# Patient Record
Sex: FEMALE | Race: OTHER | Employment: UNEMPLOYED | URBAN - METROPOLITAN AREA
[De-identification: names, ages, dates, MRNs, and addresses within clinical notes are randomized per-mention and may not be internally consistent; named-entity substitution may affect disease eponyms.]

---

## 2018-05-14 ENCOUNTER — OFFICE VISIT (OUTPATIENT)
Dept: HEMATOLOGY | Age: 53
End: 2018-05-14

## 2018-05-14 VITALS
DIASTOLIC BLOOD PRESSURE: 82 MMHG | BODY MASS INDEX: 28.89 KG/M2 | TEMPERATURE: 97 F | OXYGEN SATURATION: 94 % | RESPIRATION RATE: 18 BRPM | WEIGHT: 157 LBS | HEART RATE: 68 BPM | SYSTOLIC BLOOD PRESSURE: 122 MMHG | HEIGHT: 62 IN

## 2018-05-14 DIAGNOSIS — B18.2 CHRONIC HEPATITIS C WITHOUT HEPATIC COMA (HCC): Primary | ICD-10-CM

## 2018-05-14 DIAGNOSIS — B18.2 CHRONIC HEPATITIS C WITHOUT HEPATIC COMA (HCC): ICD-10-CM

## 2018-05-14 PROBLEM — K21.9 GERD (GASTROESOPHAGEAL REFLUX DISEASE): Status: ACTIVE | Noted: 2018-05-14

## 2018-05-14 PROBLEM — M85.80 OSTEOPENIA: Status: ACTIVE | Noted: 2018-05-14

## 2018-05-14 PROBLEM — F10.10 ALCOHOL ABUSE: Status: ACTIVE | Noted: 2018-05-14

## 2018-05-14 PROBLEM — Z72.0 TOBACCO USE: Status: ACTIVE | Noted: 2018-05-14

## 2018-05-14 PROBLEM — M06.9 CHRONIC RHEUMATIC ARTHRITIS (HCC): Status: ACTIVE | Noted: 2018-05-14

## 2018-05-14 PROBLEM — Z90.49 S/P CHOLECYSTECTOMY: Status: ACTIVE | Noted: 2018-05-14

## 2018-05-14 PROBLEM — Z98.890 H/O FOOT SURGERY: Status: ACTIVE | Noted: 2018-05-14

## 2018-05-14 RX ORDER — MONTELUKAST SODIUM 10 MG/1
TABLET ORAL
Refills: 1 | COMMUNITY
Start: 2018-04-16 | End: 2022-04-18 | Stop reason: SDUPTHER

## 2018-05-14 RX ORDER — LACTULOSE 10 G/15ML
SOLUTION ORAL; RECTAL
Refills: 0 | COMMUNITY
Start: 2018-02-21 | End: 2019-02-14

## 2018-05-14 RX ORDER — MOMETASONE FUROATE 50 UG/1
SPRAY, METERED NASAL
Refills: 6 | COMMUNITY
Start: 2018-03-26 | End: 2019-04-04

## 2018-05-14 RX ORDER — OMEPRAZOLE 20 MG/1
CAPSULE, DELAYED RELEASE ORAL
Refills: 6 | COMMUNITY
Start: 2018-03-21 | End: 2022-04-18 | Stop reason: SDUPTHER

## 2018-05-14 NOTE — MR AVS SNAPSHOT
[address] [address] 
[phone] Patient: Nancy Mays MRN: [id number] :1965 Visit Information Date & Time Provider Department Dept. Phone Encounter #  
 2018 11:30 AM MD Gómez Corbin [address] of  [address] [id number] Follow-up Instructions Return in about 4 weeks (around 2018) for NP. Upcoming Health Maintenance Date Due Hepatitis C Screening 1965 DTaP/Tdap/Td series (1 - Tdap) 1984 PAP AKA CERVICAL CYTOLOGY 1984 BREAST CANCER SCRN MAMMOGRAM 2013 FOBT Q 1 YEAR AGE 50-75 2013 Influenza Age 5 to Adult 2018 Allergies as of 2018  Review Complete On: 2018 By: Chinyere Melgoza Severity Noted Reaction Type Reactions Phenobarbital High 2014    Swelling Organs swell Dilantin [Phenytoin Sodium Extended]  2018    Swelling Current Immunizations  Never Reviewed No immunizations on file. Not reviewed this visit You Were Diagnosed With   
  
 Codes Comments Chronic hepatitis C without hepatic coma (HCC)    -  Primary ICD-10-CM: B18.2 ICD-9-CM: 070.54 Vitals BP Pulse Temp Resp Height(growth percentile) Weight(growth percentile) 122/82 (BP 1 Location: Left arm, BP Patient Position: Sitting) 68 97 °F (36.1 °C) (Tympanic) 18 5' 2\" (1.575 m) 157 lb (71.2 kg) SpO2 BMI OB Status Smoking Status 94% 28.72 kg/m2 Postmenopausal Current Every Day Smoker Vitals History BMI and BSA Data Body Mass Index Body Surface Area 28.72 kg/m 2 1.76 m 2 Your Updated Medication List  
  
   
This list is accurate as of 18 12:29 PM.  Always use your most recent med list.  
  
  
  
  
 lactulose 10 gram/15 mL solution Commonly known as:  CHRONULAC  
  
 mometasone 50 mcg/actuation nasal spray Commonly known as:  NASONEX  
  
 montelukast 10 mg tablet Commonly known as:  SINGULAIR  
  
 omeprazole 20 mg capsule Commonly known as:  PRILOSEC We Performed the Following FERRITIN [33121 CPT(R)] IRON PROFILE Z3062031 CPT(R)] Follow-up Instructions Return in about 4 weeks (around 6/11/2018) for NP. To-Do List   
 05/14/2018 Lab:  CBC WITH AUTOMATED DIFF   
  
 05/14/2018 Lab:  HCV RNA BY GENIE QL,RFLX TO QT   
  
 05/14/2018 Lab:  HEP A AB, TOTAL   
  
 05/14/2018 Lab:  HEP B SURFACE AB   
  
 05/14/2018 Lab:  HEP B SURFACE AG   
  
 05/14/2018 Lab:  HEP C GENOTYPE   
  
 05/14/2018 Lab:  HEPATIC FUNCTION PANEL   
  
 05/14/2018 Lab:  HEPATITIS B CORE AB, TOTAL   
  
 05/14/2018 Lab:  METABOLIC PANEL, BASIC   
  
 05/14/2018 Imaging:  US ABD LTD W ELASTOGRAPHY Introducing [facility] & HEALTH SERVICES! Micah Crook introduces fflap patient portal. Now you can access parts of your medical record, email your doctor's office, and request medication refills online. 1. In your internet browser, go to [email or web address] 2. Click on the First Time User? Click Here link in the Sign In box. You will see the New Member Sign Up page. 3. Enter your fflap Access Code exactly as it appears below. You will not need to use this code after youve completed the sign-up process. If you do not sign up before the expiration date, you must request a new code. · fflap Access Code: [email or web address] Expires: 8/12/2018 12:29 PM 
 
4. Enter the last four digits of your Social Security Number (xxxx) and Date of Birth (mm/dd/yyyy) as indicated and click Submit. You will be taken to the next sign-up page. 5. Create a Daojiat ID. This will be your Daojiat login ID and cannot be changed, so think of one that is secure and easy to remember. 6. Create a Daojiat password. You can change your password at any time. 7. Enter your Password Reset Question and Answer. This can be used at a later time if you forget your password. 8. Enter your e-mail address. You will receive e-mail notification when new information is available in [address] 9. Click Sign Up. You can now view and download portions of your medical record. 10. Click the Download Summary menu link to download a portable copy of your medical information. If you have questions, please visit the Frequently Asked Questions section of the [employer] website. Remember, [employer] is NOT to be used for urgent needs. For medical emergencies, dial 911. Now available from your iPhone and Android! Please provide this summary of care documentation to your next provider. Your primary care clinician is listed as Trino Solorio. If you have any questions after today's visit, please call [phone].

## 2018-05-14 NOTE — PROGRESS NOTES
[address] Coles MD, FACP, [address] [place], [place]       John Mathias, JASON Bernstein ACNP-BC   OBINNA Marquez NP [address] [facility] 136    at [address], [address] Michael Noel  22.    [phone]    FAX: [address] [place] [address], [address]    [phone]    FAX: [phone]         Patient Care Team:  Lenora Mancini as PCP - General (Physician Assistant)  Dagoberto Bell MD (Rheumatology)      Problem List  Date Reviewed: 5/14/2018          Codes Class Noted    Chronic hepatitis C ([facility] ICD-10-CM: B18.2  ICD-9-CM: 070.54  5/14/2018        Chronic rheumatic arthritis ([facility] ICD-10-CM: M06.9  ICD-9-CM: 714.0  5/14/2018        Osteopenia ICD-10-CM: M85.80  ICD-9-CM: 733.90  5/14/2018        GERD (gastroesophageal reflux disease) ICD-10-CM: K21.9  ICD-9-CM: 530.81  5/14/2018        Alcohol abuse ICD-10-CM: F10.10  ICD-9-CM: 305.00  5/14/2018        Tobacco use ICD-10-CM: Z72.0  ICD-9-CM: 305.1  5/14/2018        S/P cholecystectomy ICD-10-CM: Z90.49  ICD-9-CM: V45.79  5/14/2018                The physicians listed above have asked me to see Emily Hebert in consultation regarding chronic HCV and its management. All medical records sent by the referring physicians were reviewed including imaging studies     The patient is a 45 y.o. Black female who was noted to have abnormalities in liver chemistries and subsequently tested positive for chronic HCV in 1990s. Risk factors for acquiring HCV are IV drug use. There was no history of acute incteric hepatitis at the time of these risk factors. Imaging of the liver was not recently performed.       An assessment of liver fibrosis with biopsy or elastography has not been performed. The patient has never received treatment for chronic HCV. The most recent laboratory studies indicate that the liver transaminases are normal, alkaline phosphatase is normal, tests of hepatic synthetic and metabolic function are normal,   and the platelet count is normal.      The patient notes fatigue,     The patient has not experienced pain in the right side over the liver,     The patient has limitations in functional activities which can be attributed to other medical problems that are not related to the liver disease. ALLERGIES  Allergies   Allergen Reactions    Phenobarbital Swelling     Organs swell    Dilantin [Phenytoin Sodium Extended] Swelling       MEDICATIONS  Current Outpatient Prescriptions   Medication Sig    lactulose (CHRONULAC) 10 gram/15 mL solution     mometasone (NASONEX) 50 mcg/actuation nasal spray     omeprazole (PRILOSEC) 20 mg capsule     montelukast (SINGULAIR) 10 mg tablet      No current facility-administered medications for this visit. SYSTEM REVIEW NOT RELATED TO LIVER DISEASE OR REVIEWED ABOVE:  Constitution systems: Negative for fever, chills, weight gain, weight loss. Eyes: Negative for visual changes. ENT: Negative for sore throat, painful swallowing. Respiratory: Negative for cough, hemoptysis, SOB. Cardiology: Negative for chest pain, palpitations. GI:  Negative for constipation or diarrhea. : Negative for urinary frequency, dysuria, hematuria, nocturia. Skin: Negative for rash. Hematology: Negative for easy bruising, blood clots. Musculo-skelatal: Recent wrist surgery for RA. In a cast.    Neurologic: Chronic back pain. Psychology: Negative for anxiety, depression. FAMILY HISTORY:  The father  of CAD. The mother has the following chronic diseases: cancer. There is no family history of liver disease.       SOCIAL HISTORY:  The patient has never been . The patient has 4 children, 1 of whom is ,   The patient currently smokes 7 cigarettes daily. The patient consumes 2-5 alcoholic beverages per day. The patient is currently receiving disability. PHYSICAL EXAMINATION:  Visit Vitals    /82 (BP 1 Location: Left arm, BP Patient Position: Sitting)    Pulse 68    Temp 97 °F (36.1 °C) (Tympanic)    Resp 18    Ht 5' 2\" (1.575 m)    Wt 157 lb (71.2 kg)    SpO2 94%    BMI 28.72 kg/m2     General: No acute distress. Eyes: Sclera anicteric. ENT: No oral lesions. Thyroid normal.  Nodes: No adenopathy. Skin: No spider angiomata. No jaundice. No palmar erythema. Respiratory: Lungs clear to auscultation. Cardiovascular: Regular heart rate. No murmurs. No JVD. Abdomen: Soft non-tender. Liver size normal to percussion/palpation. Spleen not palpable. No obvious ascites. Extremities: No edema. No muscle wasting. No gross arthritic changes. Neurologic: Alert and oriented. Cranial nerves grossly intact. No asterixis. LABORATORY STUDIES:  From 10/2017  AST/ALT/ALP/T Bili/ALB:  31/32/60/0.6/4.5  WBC/HB/PLT/INR:  7.4/15.5/225  BUN/CREAT:  8/0.7    SEROLOGIES:  HCV RNA Log 5.75 intU    LIVER HISTOLOGY:  Not available or performed    ENDOSCOPIC PROCEDURES:  2018. Colonoscopy by Dr Mary Anne Simeon. Adenoma polys    RADIOLOGY:  Not available or performed    OTHER TESTING:  Not available or performed    ASSESSMENT AND PLAN:  Chronic HCV of unclear severity. Liver function is normal.  The platelet count is normal.      Will perform laboratory testing to monitor liver function and degree of liver injury. This included BMP, hepatic panel, CBC with platelet count,     Will perform and/or review results of HCV viral load and HCV genotype to define the specific treatment and duration of treatment that will be required. Will perform serologic and virologic studies to assess for other causes of chronic liver disease.       Will perform imaging of the liver with ultrasound. The need to assess liver fibrosis was discussed. Sheer wave elastography can assess liver fibrosis and determine if a patient has advanced fibrosis or cirrhosis without the need for liver biopsy. Sheer wave elastography is now available at [facility] [employer]. This will be scheduled. The patient has not previously been treated for HCV. Discussed the treatment alternatives. The SVR/cure rate for HCV now exceeds 90% with just oral anti-viral therapy and no interferon injections or significant side effects for most patients with HCV. The specific treatment is dependent upon genotype, viral load and histology. Also discussed enrolling in the Target HCV database which is nationwide program into which the results of clinical treatment of HCV is reported. The patient was directed to continue all current medications at the current dosages. There are no contraindications for the patient to take any medications that are necessary for treatment of other medical issues. The patient was counseled regarding alcohol consumption. The need for vaccination against viral hepatitis A and B will be assessed with serologic and instituted as appropriate. All of the above issues were discussed with the patient. All questions were answered. The patient expressed a clear understanding of the above. [facility] 87 in 4 weeks to initiate HCV treatment.     Dwight Sanchez MD  Liver [place] of [address], [address] [facility], [address] [place] [address] Street - [address]  [phone]

## 2018-05-14 NOTE — PROGRESS NOTES
1. Have you been to the ER, urgent care clinic since your last visit? Hospitalized since your last visit? No    2. Have you seen or consulted any other health care providers outside of the [address] [place] [address] since your last visit? Include any pap smears or colon screening.  PCP-Jimmy LEMUS. 12.     Chief Complaint   Patient presents with    New Patient

## 2018-05-24 ENCOUNTER — HOSPITAL ENCOUNTER (OUTPATIENT)
Dept: ULTRASOUND IMAGING | Age: 53
Discharge: HOME | End: 2018-05-24
Attending: INTERNAL MEDICINE
Payer: MEDICAID

## 2018-05-24 ENCOUNTER — HOSPITAL ENCOUNTER (OUTPATIENT)
Dept: LAB | Age: 53
Discharge: HOME | End: 2018-05-24

## 2018-05-24 DIAGNOSIS — B18.2 CHRONIC HEPATITIS C WITHOUT HEPATIC COMA (HCC): ICD-10-CM

## 2018-05-24 LAB — SENTARA SPECIMEN COL,SENBCF: NORMAL

## 2018-05-24 PROCEDURE — 0346T US ABD LTD W ELASTOGRAPHY: CPT

## 2018-05-24 PROCEDURE — 99001 SPECIMEN HANDLING PT-LAB: CPT | Performed by: INTERNAL MEDICINE

## 2018-05-25 LAB
A-G RATIO,AGRAT: 1.3 RATIO (ref 1.1–2.6)
ABSOLUTE LYMPHOCYTE COUNT, 10803: 1.8 K/UL (ref 1–4.8)
ALBUMIN SERPL-MCNC: 4.3 G/DL (ref 3.5–5)
ALP SERPL-CCNC: 58 U/L (ref 25–115)
ALT SERPL-CCNC: 22 U/L (ref 5–40)
ANION GAP SERPL CALC-SCNC: 17 MMOL/L
AST SERPL W P-5'-P-CCNC: 25 U/L (ref 10–37)
BASOPHILS # BLD: 0 K/UL (ref 0–0.2)
BASOPHILS NFR BLD: 0 % (ref 0–2)
BILIRUB SERPL-MCNC: 0.5 MG/DL (ref 0.2–1.2)
BILIRUBIN, DIRECT,CBIL: <0.2 MG/DL (ref 0–0.3)
BUN SERPL-MCNC: 11 MG/DL (ref 6–22)
CALCIUM SERPL-MCNC: 9.5 MG/DL (ref 8.4–10.5)
CHLORIDE SERPL-SCNC: 96 MMOL/L (ref 98–110)
CO2 SERPL-SCNC: 27 MMOL/L (ref 20–32)
CREAT SERPL-MCNC: 0.7 MG/DL (ref 0.5–1.2)
EOSINOPHIL # BLD: 0.1 K/UL (ref 0–0.5)
EOSINOPHIL NFR BLD: 2 % (ref 0–6)
ERYTHROCYTE [DISTWIDTH] IN BLOOD BY AUTOMATED COUNT: 14.3 % (ref 10–15.5)
FE % SATURATION,PSAT: 44 % (ref 20–50)
FERRITIN SERPL-MCNC: 321 NG/ML (ref 10–291)
GFRAA, 66117: >60
GFRNA, 66118: >60
GLOBULIN,GLOB: 3.3 G/DL (ref 2–4)
GLUCOSE SERPL-MCNC: 111 MG/DL (ref 70–99)
GRANULOCYTES,GRANS: 53 % (ref 40–75)
HBV SURFACE AB SER RIA-ACNC: DETECTED
HCT VFR BLD AUTO: 45 % (ref 35.1–48)
HCV PCR LOG10, 20021: 5.85 {LOG_IU}/ML
HEP B CORE AB, TOTAL, 006718: DETECTED
HEP B SURFACE AG SCRN, 006510: NORMAL
HEPATITIS C RNA-PCR, 550401: ABNORMAL IU/ML
HGB BLD-MCNC: 14.1 G/DL (ref 11.7–16)
IRON,IRN: 123 MCG/DL (ref 30–160)
LYMPHOCYTES, LYMLT: 34 % (ref 20–45)
MCH RBC QN AUTO: 29 PG (ref 26–34)
MCHC RBC AUTO-ENTMCNC: 31 G/DL (ref 31–36)
MCV RBC AUTO: 92 FL (ref 80–95)
MONOCYTES # BLD: 0.6 K/UL (ref 0.1–1)
MONOCYTES NFR BLD: 12 % (ref 3–12)
NEUTROPHILS # BLD AUTO: 2.8 K/UL (ref 1.8–7.7)
PLATELET # BLD AUTO: 223 K/UL (ref 140–440)
PMV BLD AUTO: 10.9 FL (ref 9–13)
POTASSIUM SERPL-SCNC: 4.5 MMOL/L (ref 3.5–5.5)
PROT SERPL-MCNC: 7.6 G/DL (ref 6.4–8.3)
RBC # BLD AUTO: 4.87 M/UL (ref 3.8–5.2)
SODIUM SERPL-SCNC: 140 MMOL/L (ref 133–145)
TIBC,TIBC: 277 MCG/DL (ref 228–428)
UIBC SERPL-MCNC: 154 MCG/DL (ref 110–370)
WBC # BLD AUTO: 5.2 K/UL (ref 4–11)

## 2018-05-26 LAB — HEP A AB, TOTAL, 006726: NEGATIVE

## 2018-05-31 LAB
HCV PCR LOG10, 20021: 5.85 {LOG_IU}/ML
HCV RNA QUAL PCR,9951751: NORMAL
HEPATITIS C RNA-PCR, 550401: ABNORMAL IU/ML

## 2018-06-01 LAB
HCV PCR LOG10, 20021: 5.85 {LOG_IU}/ML
HCV RNA QUAL PCR,9951751: NORMAL
HEPATITIS C GENOTYPE, 551278: NORMAL
HEPATITIS C RNA-PCR, 550401: ABNORMAL IU/ML

## 2018-06-11 ENCOUNTER — OFFICE VISIT (OUTPATIENT)
Dept: HEMATOLOGY | Age: 53
End: 2018-06-11

## 2018-06-11 VITALS
RESPIRATION RATE: 16 BRPM | TEMPERATURE: 97.1 F | DIASTOLIC BLOOD PRESSURE: 80 MMHG | SYSTOLIC BLOOD PRESSURE: 111 MMHG | HEART RATE: 68 BPM | OXYGEN SATURATION: 99 % | HEIGHT: 62 IN | BODY MASS INDEX: 27.97 KG/M2 | WEIGHT: 152 LBS

## 2018-06-11 DIAGNOSIS — B18.2 CHRONIC HEPATITIS C WITHOUT HEPATIC COMA (HCC): Primary | ICD-10-CM

## 2018-06-11 NOTE — MR AVS SNAPSHOT
[address] [address] 
[phone] Patient: Jenn Nguyen MRN: [id number] :1965 Visit Information Date & Time Provider Department Dept. Phone Encounter #  
 2018 10:00 AM OBINNA Finley [address] of  [address] [id number] Follow-up Instructions Return in about 6 weeks (around 2018). Upcoming Health Maintenance Date Due Pneumococcal 19-64 Medium Risk (1 of 1 - PPSV23) 1982 DTaP/Tdap/Td series (1 - Tdap) 1984 PAP AKA CERVICAL CYTOLOGY 1984 BREAST CANCER SCRN MAMMOGRAM 2013 FOBT Q 1 YEAR AGE 50-75 2013 Influenza Age 5 to Adult 2018 Allergies as of 2018  Review Complete On: 2018 By: Martha Dial Severity Noted Reaction Type Reactions Phenobarbital High 2014    Swelling Organs swell Dilantin [Phenytoin Sodium Extended]  2018    Swelling Current Immunizations  Never Reviewed No immunizations on file. Not reviewed this visit Vitals BP Pulse Temp Resp Height(growth percentile) 111/80 (BP 1 Location: Right arm, BP Patient Position: Sitting) 68 97.1 °F (36.2 °C) (Tympanic) 16 5' 2\" (1.575 m) Weight(growth percentile) SpO2 BMI OB Status Smoking Status 152 lb (68.9 kg) 99% 27.8 kg/m2 Postmenopausal Current Every Day Smoker BMI and BSA Data Body Mass Index Body Surface Area  
 27.8 kg/m 2 1.74 m 2 Preferred Pharmacy Pharmacy Name Phone [facility] @ [address] [phone] Your Updated Medication List  
  
   
This list is accurate as of 18 10:18 AM.  Always use your most recent med list.  
  
  
  
  
 glecaprevir-pibrentasvir 100-40 mg Tab Commonly known as:  Debra Serrano Take 1 Packet by mouth daily for 84 days.  Indications: CHRONIC HEPATITIS C - GENOTYPE 4  
  
 lactulose 10 gram/15 mL solution Commonly known as:  CHRONULAC  
  
 mometasone 50 mcg/actuation nasal spray Commonly known as:  NASONEX  
  
 montelukast 10 mg tablet Commonly known as:  SINGULAIR  
  
 omeprazole 20 mg capsule Commonly known as:  PRILOSEC Prescriptions Sent to Pharmacy Refills  
 glecaprevir-pibrentasvir (MAVYRET) 100-40 mg tab 2 Sig: Take 1 Packet by mouth daily for 84 days. Indications: CHRONIC HEPATITIS C - GENOTYPE 4 Class: Normal  
 [facility] [phone] Route: Oral  
  
Follow-up Instructions Return in about 6 weeks (around 7/23/2018). Introducing [facility] & HEALTH SERVICES! [employer] introduces [employer] patient portal. Now you can access parts of your medical record, email your doctor's office, and request medication refills online. 1. In your internet browser, go to [email or web address] 2. Click on the First Time User? Click Here link in the Sign In box. You will see the New Member Sign Up page. 3. Enter your [employer] Access Code exactly as it appears below. You will not need to use this code after youve completed the sign-up process. If you do not sign up before the expiration date, you must request a new code. · [employer] Access Code: [email or web address] Expires: 8/12/2018 12:29 PM 
 
4. Enter the last four digits of your Social Security Number (xxxx) and Date of Birth (mm/dd/yyyy) as indicated and click Submit. You will be taken to the next sign-up page. 5. Create a Phi Opticst ID. This will be your [employer] login ID and cannot be changed, so think of one that is secure and easy to remember. 6. Create a [employer] password. You can change your password at any time. 7. Enter your Password Reset Question and Answer. This can be used at a later time if you forget your password. 8. Enter your e-mail address. You will receive e-mail notification when new information is available in [address] 9. Click Sign Up. You can now view and download portions of your medical record. 10. Click the Download Summary menu link to download a portable copy of your medical information. If you have questions, please visit the Frequently Asked Questions section of the [employer] website. Remember, [employer] is NOT to be used for urgent needs. For medical emergencies, dial 911. Now available from your iPhone and Android! Please provide this summary of care documentation to your next provider. Your primary care clinician is listed as Zora Lemus. If you have any questions after today's visit, please call [phone].

## 2018-06-11 NOTE — PROGRESS NOTES
70 Noel Coles MD, FACP, [place], [place]       Albertina Barry, JASON Bateman Cera, GUANAKOP-BC   Annita Araiza, OBINNA Dominguez [facility] 136    at [address], [address] [place]  22.    [phone]    FAX: [address] [place] [address], [address] [place] [address], [address]    [phone]    FAX: [phone]         Patient Care Team:  Presley Segundo, 49[address] Elisa Mendez as PCP - General (Physician Assistant)  Jordan Weinstein MD (Rheumatology)      Problem List  Date Reviewed: 6/11/2018          Codes Class Noted    Chronic hepatitis C ([facility] 75.) ICD-10-CM: B18.2  ICD-9-CM: 070.54  5/14/2018        Chronic rheumatic arthritis ([facility] ICD-10-CM: M06.9  ICD-9-CM: 714.0  5/14/2018        Osteopenia ICD-10-CM: M85.80  ICD-9-CM: 733.90  5/14/2018        GERD (gastroesophageal reflux disease) ICD-10-CM: K21.9  ICD-9-CM: 530.81  5/14/2018        Alcohol abuse ICD-10-CM: F10.10  ICD-9-CM: 305.00  5/14/2018        Tobacco use ICD-10-CM: Z72.0  ICD-9-CM: 305.1  5/14/2018        S/P cholecystectomy ICD-10-CM: Z90.49  ICD-9-CM: V45.79  5/14/2018                The physicians listed above have asked me to see Michelle Joe in consultation regarding chronic HCV and its management. All medical records sent by the referring physicians were reviewed including imaging studies     The patient is a 45 y.o. Black female who was noted to have abnormalities in liver chemistries and subsequently tested positive for chronic HCV in 1990s. Risk factors for acquiring HCV are IV drug use. There was no history of acute incteric hepatitis at the time of these risk factors. Imaging of the liver was not recently performed.       An assessment of liver fibrosis with biopsy or elastography has not been performed. The patient has never received treatment for chronic HCV. The most recent laboratory studies indicate that the liver transaminases are normal, alkaline phosphatase is normal, tests of hepatic synthetic and metabolic function are normal,   and the platelet count is normal.      The patient notes fatigue,     The patient has not experienced pain in the right side over the liver,     The patient has limitations in functional activities which can be attributed to other medical problems that are not related to the liver disease. ALLERGIES  Allergies   Allergen Reactions    Phenobarbital Swelling     Organs swell    Dilantin [Phenytoin Sodium Extended] Swelling       MEDICATIONS  Current Outpatient Prescriptions   Medication Sig    lactulose (CHRONULAC) 10 gram/15 mL solution     mometasone (NASONEX) 50 mcg/actuation nasal spray     omeprazole (PRILOSEC) 20 mg capsule     montelukast (SINGULAIR) 10 mg tablet      No current facility-administered medications for this visit. SYSTEM REVIEW NOT RELATED TO LIVER DISEASE OR REVIEWED ABOVE:  Constitution systems: Negative for fever, chills, weight gain, weight loss. Eyes: Negative for visual changes. ENT: Negative for sore throat, painful swallowing. Respiratory: Negative for cough, hemoptysis, SOB. Cardiology: Negative for chest pain, palpitations. GI:  Negative for constipation or diarrhea. : Negative for urinary frequency, dysuria, hematuria, nocturia. Skin: Negative for rash. Hematology: Negative for easy bruising, blood clots. Musculo-skelatal: Recent wrist surgery for RA. In a cast.    Neurologic: Chronic back pain. Psychology: Negative for anxiety, depression. FAMILY HISTORY:  The father  of CAD. The mother has the following chronic diseases: cancer. There is no family history of liver disease.       SOCIAL HISTORY:  The patient has never been . The patient has 4 children, 1 of whom is ,   The patient currently smokes 7 cigarettes daily. The patient consumes 2-5 alcoholic beverages per day. The patient is currently receiving disability. PHYSICAL EXAMINATION:  Visit Vitals    /80 (BP 1 Location: Right arm, BP Patient Position: Sitting)    Pulse 68    Temp 97.1 °F (36.2 °C) (Tympanic)    Resp 16    Ht 5' 2\" (1.575 m)    Wt 152 lb (68.9 kg)    SpO2 99%    BMI 27.8 kg/m2     General: No acute distress. Eyes: Sclera anicteric. ENT: No oral lesions. Thyroid normal.  Nodes: No adenopathy. Skin: No spider angiomata. No jaundice. No palmar erythema. Respiratory: Lungs clear to auscultation. Cardiovascular: Regular heart rate. No murmurs. No JVD. Abdomen: Soft non-tender. Liver size normal to percussion/palpation. Spleen not palpable. No obvious ascites. Extremities: No edema. No muscle wasting. No gross arthritic changes. Neurologic: Alert and oriented. Cranial nerves grossly intact. No asterixis.     LABORATORY STUDIES:  Liver [place] [address] Units 2018   WBC 4.0 - 11.0 K/uL 5.2   ANC 1.8 - 7.7 K/uL 2.8   HGB 11.7 - 16.0 g/dL 14.1    - 440 K/uL 223   AST 10 - 37 U/L 25   ALT 5 - 40 U/L 22   Alk Phos 25 - 115 U/L 58   Bili, Total 0.2 - 1.2 mg/dL 0.5   Bili, Direct 0.0 - 0.3 mg/dL <0.2   Albumin 3.5 - 5.0 g/dL 4.3   BUN 6 - 22 mg/dL 11   Creat 0.5 - 1.2 mg/dL 0.7   Na 133 - 145 mmol/L 140   K 3.5 - 5.5 mmol/L 4.5   Cl 98 - 110 mmol/L 96 (L)   CO2 20 - 32 mmol/L 27   Glucose 70 - 99 mg/dL 111 (H)     From 10/2017  AST/ALT/ALP/T Bili/ALB:  31/32/60/0.6/4.5  WBC/HB/PLT/INR:  7.4/15.5/225  BUN/CREAT:  8/0.7    SEROLOGIES:  Serologies Latest Ref Rng & Units 2018   Hep A Ab, Total Negative Negative   Hep B Surface Ag None Detec None Detected   Hep B Core Ab, Total None Detec Detected (A)   Hep B Surface Ab None Detec Detected (A)   Hep C Genotype  four   Ferritin 10 - 291 ng/mL 321 (H)   Iron % Saturation 20 - 50 % 44     HCV RNA Log 5.75 intU    LIVER HISTOLOGY:  05/2018. Shear wave elastography. Elastography mean value is 5.66 kPa with standard deviation of 0.62 kPa. ENDOSCOPIC PROCEDURES:  4/2018. Colonoscopy by Dr Asia Alejandra. Adenoma polys    RADIOLOGY:  05/2018. Ultrasound of the liver. Heterogeneous increased hepatic echogenicity without focal abnormality. OTHER TESTING:  Not available or performed    ASSESSMENT AND PLAN:  Chronic HCV with portal fibrosis, F1, per elastography. The most recent laboratory studies indicate that the liver transaminases are normal, alkaline phosphatase is normal, tests of hepatic synthetic and metabolic function are normal, and the platelet count is normal.      Serologic evaluation was negative for all causes of chronic liver disease. HCV. The patient has genotype 4 and is treatment naive. We discussed treatment options in detail. One treatment option is Mavyret. Bennye Eubanks is a recently approved pan genotypic treatment regime utilizing glecaprevir (an NS3/4A protease inhibitor) and pibrentasvir (an NS5A inhibitor) for 8 weeks. I explained that she is to take three tabs daily in a single dose with food. Bennye Eubanks was ordered through [place] [address] in [place] during this visit. This regime was discussed in detail, including dosing and duration of treatment. Educational material was provided. Recent ultrasound suggests heterogeneously increased hepatic echogenicity which may represent steatosis, steatohepatitis or chronic  hepatocellular disease such as cirrhosis. Shear wave elastography suggests that the hepatic fibrosis is normal to mild, F1 on the Metavir scale. Also discussed enrolling in the Target HCV database which is nationwide program into which the results of clinical treatment of HCV is reported. The patient was directed to continue all current medications at the current dosages. There are no contraindications for the patient to take any medications that are necessary for treatment of other medical issues. The patient was counseled regarding alcohol consumption. Vaccination for viral hepatitis B is not required. The patient has serologic evidence of prior exposure or vaccination with immunity. She does not have immunity to hepatitis A and should consider vaccination. All of the above issues were discussed with the patient. All questions were answered. The patient expressed a clear understanding of the above. [address] [address] in 6 weeks, this should be about treatment week 4.       SHYANNE Borges-SHELTON  Liver [place] [address], [address] [place] [address]   [phone]

## 2018-06-11 NOTE — PROGRESS NOTES
Giles Wilde is a 45 y.o. female      1. Have you been to the ER, urgent care clinic or hospitalized since your last visit? NO.     2. Have you seen or consulted any other health care providers outside of the [facility] since your last visit (Include any pap smears or colon screening)?  NO          Learning Assessment 5/14/2018   PRIMARY LEARNER Patient   PRIMARY LANGUAGE ENGLISH   LEARNER PREFERENCE PRIMARY DEMONSTRATION   ANSWERED BY patient   RELATIONSHIP SELF

## 2018-07-17 ENCOUNTER — TELEPHONE (OUTPATIENT)
Dept: HEMATOLOGY | Age: 53
End: 2018-07-17

## 2018-07-17 NOTE — TELEPHONE ENCOUNTER
Coordinated care called to inform NP Sonia Story that the patient is still drinking on a daily basis, insurance has only approved 8 weeks of treatment, and that patient is currently on lipitor 10 mg (per patient).

## 2018-07-23 RX ORDER — ROSUVASTATIN CALCIUM 10 MG/1
10 TABLET, COATED ORAL
Qty: 60 TAB | Refills: 0 | Status: SHIPPED | OUTPATIENT
Start: 2018-07-23 | End: 2018-10-08 | Stop reason: SDUPTHER

## 2018-09-05 ENCOUNTER — TELEPHONE (OUTPATIENT)
Dept: HEMATOLOGY | Age: 53
End: 2018-09-05

## 2018-09-05 NOTE — TELEPHONE ENCOUNTER
[facility] pharmacy calling to inform NP Cy Rucker that the patient has completed mayvret tx and wanted to make sure NP was aware insurance had only approved 8 weeks of treatment . Jacklyn Mason Informed coordinator that someone from there pharmacy called on 07/17/18 to inform that tx med was only approved for 8wks so provider should be aware but I would send message to verify . Jacklyn Mason

## 2018-09-11 NOTE — TELEPHONE ENCOUNTER
Called pt to get update on HCV medication completion date, pt stated she has a week left of tx. F/u appt scheduled for 10/2/18.

## 2018-09-19 ENCOUNTER — TELEPHONE (OUTPATIENT)
Dept: HEMATOLOGY | Age: 53
End: 2018-09-19

## 2018-09-19 NOTE — TELEPHONE ENCOUNTER
----- Message from [facility] sent at 8/29/2018  1:26 PM EDT -----  Regarding: OBINNA Heck/ telephone  The pt is requesting a callback to schedule a lab visit.       Best contact number is [phone]

## 2018-09-19 NOTE — TELEPHONE ENCOUNTER
Called patient to inform her no appt is needed to get lab work done and to verify next appt.  No answer no nvm left due to phone constant ringing

## 2018-10-08 RX ORDER — ROSUVASTATIN CALCIUM 10 MG/1
TABLET, COATED ORAL
Qty: 30 TAB | Refills: 0 | Status: SHIPPED | OUTPATIENT
Start: 2018-10-08

## 2019-02-14 ENCOUNTER — OFFICE VISIT (OUTPATIENT)
Dept: HEMATOLOGY | Age: 54
End: 2019-02-14

## 2019-02-14 ENCOUNTER — HOSPITAL ENCOUNTER (OUTPATIENT)
Dept: LAB | Age: 54
Discharge: HOME | End: 2019-02-14

## 2019-02-14 VITALS
HEART RATE: 65 BPM | DIASTOLIC BLOOD PRESSURE: 82 MMHG | HEIGHT: 62 IN | BODY MASS INDEX: 26.13 KG/M2 | TEMPERATURE: 97.7 F | RESPIRATION RATE: 16 BRPM | OXYGEN SATURATION: 100 % | WEIGHT: 142 LBS | SYSTOLIC BLOOD PRESSURE: 121 MMHG

## 2019-02-14 DIAGNOSIS — B18.2 CHRONIC HEPATITIS C WITHOUT HEPATIC COMA (HCC): Primary | ICD-10-CM

## 2019-02-14 LAB — SENTARA SPECIMEN COL,SENBCF: NORMAL

## 2019-02-14 PROCEDURE — 99001 SPECIMEN HANDLING PT-LAB: CPT

## 2019-02-14 NOTE — PROGRESS NOTES
70 Noel Coles MD, Omi Ferreira, [place], [place]       Blue López, JASON King, DIMITRIOS-BC   Epifanio Luo, OBINNA Abreu [facility] 136    at [address], [address] [place] [address], [facility] 22.    [phone]    FAX: [address] [place] [address], [address]    [phone]    FAX: [phone]         Patient Care Team:  Patricia Rodriguez MD as PCP - General (Family Practice)  Estelita Gore MD (Rheumatology)      Problem List  Date Reviewed: 6/11/2018          Codes Class Noted    Chronic hepatitis C ([facility] 75.) ICD-10-CM: B18.2  ICD-9-CM: 070.54  5/14/2018        Chronic rheumatic arthritis ([facility] 75.) ICD-10-CM: M06.9  ICD-9-CM: 714.0  5/14/2018        Osteopenia ICD-10-CM: M85.80  ICD-9-CM: 733.90  5/14/2018        GERD (gastroesophageal reflux disease) ICD-10-CM: K21.9  ICD-9-CM: 530.81  5/14/2018        Alcohol abuse ICD-10-CM: F10.10  ICD-9-CM: 305.00  5/14/2018        Tobacco use ICD-10-CM: Z72.0  ICD-9-CM: 305.1  5/14/2018        S/P cholecystectomy ICD-10-CM: Z90.49  ICD-9-CM: V45.79  5/14/2018              Felix Brantley returns to the [place] [address] today for education and management of chronic HCV. She completed 8 weeks of HCV treatment Mavyret sometime in the middle of September, 2018. Tete did not inform this office when she began treatment and did not make the agreed upon follow up visit for treatment week 4. She does not know when she completed the treatment either. This is the only time the HCV has ever been treated.       The active problem list, all pertinent past medical history, medications, liver histology, endoscopic studies, radiologic findings and laboratory findings related to the liver disorder were reviewed with the patient. The patient is a 52 y.o. Black female who was noted to have abnormalities in liver chemistries and subsequently tested positive for chronic HCV in 1990s. Risk factors for acquiring HCV are IV drug use. There was no history of acute incteric hepatitis at the time of these risk factors. Imaging of the liver was performed in 05/2018 with ultrasound. Heterogeneous increased hepatic echogenicity without focal abnormality. An assessment of liver fibrosis with shear wave elastography was also performed in 05/2018. This suggests a Metavir fibrosis score of F1. The most recent laboratory studies indicate that the liver transaminases are normal, alkaline phosphatase is normal, tests of hepatic synthetic and metabolic function are normal, and the platelet count is normal.      The patient notes arthritic pain from her RA. The patient has no complaints which can be attributed to liver disease. The patient completes all daily activities without any functional limitations. The patient has not experienced fatigue, fevers, chills, shortness of breath, chest pain, pain in the right side over the liver, diffuse abdominal pain, nausea, vomiting, constipation, diarrhrea, dry eyes, dry mouth, arthralgias, myalgias, yellowing of the eyes or skin, itching, dark urine, problems concentrating, swelling of the abdomen, swelling of the lower extremities, hematemesis, or hematochezia.         ALLERGIES  Allergies   Allergen Reactions    Phenobarbital Swelling     Organs swell    Dilantin [Phenytoin Sodium Extended] Swelling       MEDICATIONS  Current Outpatient Medications   Medication Sig    rosuvastatin (CRESTOR) 10 mg tablet TAKE ONE TABLET BY MOUTH AT BEDTIME     mometasone (NASONEX) 50 mcg/actuation nasal spray     omeprazole (PRILOSEC) 20 mg capsule     montelukast (SINGULAIR) 10 mg tablet      No current facility-administered medications for this visit. SYSTEM REVIEW NOT RELATED TO LIVER DISEASE OR REVIEWED ABOVE:  Constitution systems: Negative for fever, chills, weight gain, weight loss. Eyes: Negative for visual changes. ENT: Negative for sore throat, painful swallowing. Respiratory: Negative for cough, hemoptysis, SOB. Cardiology: Negative for chest pain, palpitations. GI:  Negative for constipation or diarrhea. : Negative for urinary frequency, dysuria, hematuria, nocturia. Skin: Negative for rash. Hematology: Negative for easy bruising, blood clots. Musculo-skelatal: Negative for muscle pain, weakness. Positive for back pain. Neurologic: Negative for headaches, memory problems. Psychology: Negative for anxiety, depression. FAMILY HISTORY:  The father  of CAD. The mother has the following chronic diseases: cancer. There is no family history of liver disease. SOCIAL HISTORY:  The patient has never been . The patient has 4 children, 1 of whom is ,   The patient currently smokes 5 cigarettes daily. The patient consumes 2-5 alcoholic beverages per day. The patient is currently receiving disability. PHYSICAL EXAMINATION:  Visit Vitals  /82 (BP 1 Location: Right arm, BP Patient Position: Sitting)   Pulse 65   Temp 97.7 °F (36.5 °C) (Tympanic)   Resp 16   Ht 5' 2\" (1.575 m)   Wt 142 lb (64.4 kg)   SpO2 100%   BMI 25.97 kg/m²     General: No acute distress. Eyes: Sclera anicteric. ENT: No oral lesions. Thyroid normal.  Nodes: No adenopathy. Skin: No spider angiomata. No jaundice. No palmar erythema. Respiratory: Lungs clear to auscultation. Cardiovascular: Regular heart rate. No murmurs. No JVD. Abdomen: Soft non-tender. Liver size normal to percussion/palpation. Spleen not palpable. No obvious ascites. Extremities: No edema. No muscle wasting. No gross arthritic changes. Neurologic: Alert and oriented. Cranial nerves grossly intact.   No kalieixis. LABORATORY STUDIES:  From 12/2018:  AST/ ALT/ ALP/ T. BILI/ ALB:  12/ 9/ 52/ 0.3/ 4.2  BUN/ CRT:  8/ 0.7  PLT:  [address] [facility] Ref Rng & Units 5/24/2018   WBC 4.0 - 11.0 K/uL 5.2   ANC 1.8 - 7.7 K/uL 2.8   HGB 11.7 - 16.0 g/dL 14.1    - 440 K/uL 223   AST 10 - 37 U/L 25   ALT 5 - 40 U/L 22   Alk Phos 25 - 115 U/L 58   Bili, Total 0.2 - 1.2 mg/dL 0.5   Bili, Direct 0.0 - 0.3 mg/dL <0.2   Albumin 3.5 - 5.0 g/dL 4.3   BUN 6 - 22 mg/dL 11   Creat 0.5 - 1.2 mg/dL 0.7   Na 133 - 145 mmol/L 140   K 3.5 - 5.5 mmol/L 4.5   Cl 98 - 110 mmol/L 96 (L)   CO2 20 - 32 mmol/L 27   Glucose 70 - 99 mg/dL 111 (H)     From 10/2017  AST/ALT/ALP/T Bili/ALB:  31/32/60/0.6/4.5  WBC/HB/PLT/INR:  7.4/15.5/225  BUN/CREAT:  8/0.7    SEROLOGIES:  Serologies Latest Ref Rng & Units 5/24/2018   Hep A Ab, Total Negative Negative   Hep B Surface Ag None Detec None Detected   Hep B Core Ab, Total None Detec Detected (A)   Hep B Surface Ab None Detec Detected (A)   Hep C Genotype  four   Ferritin 10 - 291 ng/mL 321 (H)   Iron % Saturation 20 - 50 % 44     HCV RNA Log 5.75 intU    LIVER HISTOLOGY:  05/2018. Shear wave elastography. Elastography mean value is 5.66 kPa with standard deviation of 0.62 kPa. ENDOSCOPIC PROCEDURES:  4/2018. Colonoscopy by Dr Amari Hilario. Adenoma polys    RADIOLOGY:  05/2018. Ultrasound of the liver. Heterogeneous increased hepatic echogenicity without focal abnormality. OTHER TESTING:  Not available or performed    ASSESSMENT AND PLAN:  Chronic HCV with portal fibrosis, F1, per elastography. The most recent laboratory studies indicate that the liver transaminases are normal, alkaline phosphatase is normal, tests of hepatic synthetic and metabolic function are normal, and the platelet count is normal.  Will perform laboratory testing to monitor liver function and degree of liver injury. This will include hepatic panel, a CBC w/ diff, a BMP, and HCV RNA.     Serologic evaluation was negative for all causes of chronic liver disease. HCV. The patient has genotype 4. She completed 8 weeks of HCV treatment Mavyret sometime in the middle of September, 2018. Tete did not inform this office when she began treatment and did not make the agreed upon follow up visit for treatment week 4. She does not know when she completed the treatment either. This is the only time the HCV has ever been treated. She denies missing any doses of the medication. She had treatment related complaints. Prior to treatment, her cholesterol medication was changed from Lipitor to Crestor 10 mg. This was not changed back, as I was unaware that she had completed the therapy. She saw her PCP last month, and the Crestor was not changed. I explained to the patient that she should have her PCP prescribe whatever they want for the cholesterol as the patient is no longer taking Mavyret. If there is no virus detected on today's labs, the patient is cured of the HCV infection and she does not need to return to the [facility]. Recent ultrasound suggests heterogeneously increased hepatic echogenicity. No hepatic masses. Shear wave elastography suggests that the hepatic fibrosis is normal to mild, F1 on the Metavir scale. The patient was directed to continue all current medications at the current dosages. There are no contraindications for the patient to take any medications that are necessary for treatment of other medical issues. The patient was counseled regarding alcohol consumption. Vaccination for viral hepatitis B is not required. The patient has serologic evidence of prior exposure or vaccination with immunity. She does not have immunity to hepatitis A and should consider vaccination. All of the above issues were discussed with the patient. All questions were answered. The patient expressed a clear understanding of the above.     Matti Calixto of [place] on a PRN basis.      SHYANNE Howell-C  Liver [place] [facility]  [address] [facility], [address] [facility], [address] [facility]   [phone]

## 2019-02-14 NOTE — PROGRESS NOTES
Jeb Davalos is a 52 y.o. female      1. Have you been to the ER, urgent care clinic or hospitalized since your last visit? NO.     2. Have you seen or consulted any other health care providers outside of the [address] [place] [address] since your last visit (Include any pap smears or colon screening)?  NO          Learning Assessment 5/14/2018   PRIMARY LEARNER Patient   PRIMARY LANGUAGE ENGLISH   LEARNER PREFERENCE PRIMARY DEMONSTRATION   ANSWERED BY patient   RELATIONSHIP SELF

## 2019-02-15 LAB
A-G RATIO,AGRAT: 1.5 RATIO (ref 1.1–2.6)
ABSOLUTE LYMPHOCYTE COUNT, 10803: 2.1 K/UL (ref 1–4.8)
ALBUMIN SERPL-MCNC: 4.5 G/DL (ref 3.5–5)
ALP SERPL-CCNC: 52 U/L (ref 25–115)
ALT SERPL-CCNC: 10 U/L (ref 5–40)
ANION GAP SERPL CALC-SCNC: 13 MMOL/L
AST SERPL W P-5'-P-CCNC: 15 U/L (ref 10–37)
BASOPHILS # BLD: 0 K/UL (ref 0–0.2)
BASOPHILS NFR BLD: 0 % (ref 0–2)
BILIRUB SERPL-MCNC: 0.5 MG/DL (ref 0.2–1.2)
BILIRUBIN, DIRECT,CBIL: <0.2 MG/DL (ref 0–0.3)
BUN SERPL-MCNC: 10 MG/DL (ref 6–22)
CALCIUM SERPL-MCNC: 9.5 MG/DL (ref 8.4–10.5)
CHLORIDE SERPL-SCNC: 99 MMOL/L (ref 98–110)
CO2 SERPL-SCNC: 29 MMOL/L (ref 20–32)
CREAT SERPL-MCNC: 0.7 MG/DL (ref 0.5–1.2)
EOSINOPHIL # BLD: 0.1 K/UL (ref 0–0.5)
EOSINOPHIL NFR BLD: 1 % (ref 0–6)
ERYTHROCYTE [DISTWIDTH] IN BLOOD BY AUTOMATED COUNT: 13.9 % (ref 10–15.5)
GFRAA, 66117: >60
GFRNA, 66118: >60
GLOBULIN,GLOB: 3.1 G/DL (ref 2–4)
GLUCOSE SERPL-MCNC: 89 MG/DL (ref 70–99)
GRANULOCYTES,GRANS: 58 % (ref 40–75)
HCT VFR BLD AUTO: 45.7 % (ref 35.1–48)
HEPATITIS C VIRUS RNA PCR TND: NORMAL IU/ML
HGB BLD-MCNC: 14.7 G/DL (ref 11.7–16)
LYMPHOCYTES, LYMLT: 31 % (ref 20–45)
MCH RBC QN AUTO: 29 PG (ref 26–34)
MCHC RBC AUTO-ENTMCNC: 32 G/DL (ref 31–36)
MCV RBC AUTO: 91 FL (ref 80–95)
MONOCYTES # BLD: 0.7 K/UL (ref 0.1–1)
MONOCYTES NFR BLD: 10 % (ref 3–12)
NEUTROPHILS # BLD AUTO: 3.8 K/UL (ref 1.8–7.7)
PLATELET # BLD AUTO: 233 K/UL (ref 140–440)
PMV BLD AUTO: 11.3 FL (ref 9–13)
POTASSIUM SERPL-SCNC: 4.2 MMOL/L (ref 3.5–5.5)
PROT SERPL-MCNC: 7.6 G/DL (ref 6.4–8.3)
RBC # BLD AUTO: 5.05 M/UL (ref 3.8–5.2)
SODIUM SERPL-SCNC: 141 MMOL/L (ref 133–145)
WBC # BLD AUTO: 6.6 K/UL (ref 4–11)

## 2019-02-25 ENCOUNTER — TELEPHONE (OUTPATIENT)
Dept: HEMATOLOGY | Age: 54
End: 2019-02-25

## 2019-02-25 NOTE — TELEPHONE ENCOUNTER
Updated on current labs. No HCV detected. This represents SVR 12. The HCV has bee cured. Follow up as scheduled.

## 2019-04-01 ENCOUNTER — HOSPITAL ENCOUNTER (OUTPATIENT)
Dept: PREADMISSION TESTING | Age: 54
Discharge: HOME | End: 2019-04-01
Payer: MEDICAID

## 2019-04-01 LAB
ANION GAP SERPL CALC-SCNC: 4 MMOL/L (ref 3–18)
APTT PPP: 24.1 SEC (ref 23–36.4)
ATRIAL RATE: 71 BPM
BUN SERPL-MCNC: 8 MG/DL (ref 7–18)
BUN/CREAT SERPL: 10 (ref 12–20)
CALCIUM SERPL-MCNC: 9 MG/DL (ref 8.5–10.1)
CALCULATED P AXIS, ECG09: 71 DEGREES
CALCULATED R AXIS, ECG10: 44 DEGREES
CALCULATED T AXIS, ECG11: 56 DEGREES
CHLORIDE SERPL-SCNC: 105 MMOL/L (ref 100–108)
CO2 SERPL-SCNC: 30 MMOL/L (ref 21–32)
CREAT SERPL-MCNC: 0.77 MG/DL (ref 0.6–1.3)
DIAGNOSIS, 93000: NORMAL
GLUCOSE SERPL-MCNC: 89 MG/DL (ref 74–99)
HCT VFR BLD AUTO: 43.8 % (ref 35–45)
HGB BLD-MCNC: 13.8 G/DL (ref 12–16)
INR PPP: 0.9 (ref 0.8–1.2)
P-R INTERVAL, ECG05: 134 MS
POTASSIUM SERPL-SCNC: 4 MMOL/L (ref 3.5–5.5)
PROTHROMBIN TIME: 12.2 SEC (ref 11.5–15.2)
Q-T INTERVAL, ECG07: 434 MS
QRS DURATION, ECG06: 88 MS
QTC CALCULATION (BEZET), ECG08: 471 MS
SODIUM SERPL-SCNC: 139 MMOL/L (ref 136–145)
VENTRICULAR RATE, ECG03: 71 BPM

## 2019-04-01 PROCEDURE — 85018 HEMOGLOBIN: CPT

## 2019-04-01 PROCEDURE — 85730 THROMBOPLASTIN TIME PARTIAL: CPT

## 2019-04-01 PROCEDURE — 93005 ELECTROCARDIOGRAM TRACING: CPT

## 2019-04-01 PROCEDURE — 85610 PROTHROMBIN TIME: CPT

## 2019-04-01 PROCEDURE — 80048 BASIC METABOLIC PNL TOTAL CA: CPT

## 2019-04-01 PROCEDURE — 36415 COLL VENOUS BLD VENIPUNCTURE: CPT

## 2019-04-03 ENCOUNTER — ANESTHESIA EVENT (OUTPATIENT)
Dept: SURGERY | Age: 54
End: 2019-04-03
Payer: MEDICAID

## 2019-04-04 ENCOUNTER — ANESTHESIA (OUTPATIENT)
Dept: SURGERY | Age: 54
End: 2019-04-04
Payer: MEDICAID

## 2019-04-04 ENCOUNTER — HOSPITAL ENCOUNTER (OUTPATIENT)
Age: 54
Setting detail: OUTPATIENT SURGERY
Discharge: HOME | End: 2019-04-04
Attending: OTOLARYNGOLOGY | Admitting: OTOLARYNGOLOGY
Payer: MEDICAID

## 2019-04-04 VITALS
HEART RATE: 80 BPM | OXYGEN SATURATION: 99 % | TEMPERATURE: 97.8 F | RESPIRATION RATE: 18 BRPM | HEIGHT: 62 IN | SYSTOLIC BLOOD PRESSURE: 139 MMHG | WEIGHT: 140.44 LBS | BODY MASS INDEX: 25.84 KG/M2 | DIASTOLIC BLOOD PRESSURE: 78 MMHG

## 2019-04-04 DIAGNOSIS — R09.81 NASAL CONGESTION: Primary | ICD-10-CM

## 2019-04-04 PROCEDURE — 77030002974 HC SUT PLN J&J -A: Performed by: OTOLARYNGOLOGY

## 2019-04-04 PROCEDURE — 74011000250 HC RX REV CODE- 250: Performed by: OTOLARYNGOLOGY

## 2019-04-04 PROCEDURE — 74011000250 HC RX REV CODE- 250

## 2019-04-04 PROCEDURE — 77030020782 HC GWN BAIR PAWS FLX 3M -B: Performed by: OTOLARYNGOLOGY

## 2019-04-04 PROCEDURE — 74011250637 HC RX REV CODE- 250/637: Performed by: OTOLARYNGOLOGY

## 2019-04-04 PROCEDURE — 77030002986 HC SUT PROL J&J -A: Performed by: OTOLARYNGOLOGY

## 2019-04-04 PROCEDURE — 76060000032 HC ANESTHESIA 0.5 TO 1 HR: Performed by: OTOLARYNGOLOGY

## 2019-04-04 PROCEDURE — 76210000021 HC REC RM PH II 0.5 TO 1 HR: Performed by: OTOLARYNGOLOGY

## 2019-04-04 PROCEDURE — 76010000138 HC OR TIME 0.5 TO 1 HR: Performed by: OTOLARYNGOLOGY

## 2019-04-04 PROCEDURE — 74011250636 HC RX REV CODE- 250/636

## 2019-04-04 PROCEDURE — 74011250636 HC RX REV CODE- 250/636: Performed by: OTOLARYNGOLOGY

## 2019-04-04 PROCEDURE — 76210000006 HC OR PH I REC 0.5 TO 1 HR: Performed by: OTOLARYNGOLOGY

## 2019-04-04 PROCEDURE — 77030011645 HC PK NSL DOYLE MEDT -B: Performed by: OTOLARYNGOLOGY

## 2019-04-04 PROCEDURE — 77030002888 HC SUT CHRMC J&J -A: Performed by: OTOLARYNGOLOGY

## 2019-04-04 PROCEDURE — 77030012508 HC MSK AIRWY LMA AMBU -A: Performed by: ANESTHESIOLOGY

## 2019-04-04 PROCEDURE — 77030032490 HC SLV COMPR SCD KNE COVD -B: Performed by: OTOLARYNGOLOGY

## 2019-04-04 RX ORDER — EPHEDRINE SULFATE 50 MG/ML
INJECTION, SOLUTION INTRAVENOUS AS NEEDED
Status: DISCONTINUED | OUTPATIENT
Start: 2019-04-04 | End: 2019-04-04 | Stop reason: HOSPADM

## 2019-04-04 RX ORDER — DEXTROSE 50 % IN WATER (D50W) INTRAVENOUS SYRINGE
25-50 AS NEEDED
Status: DISCONTINUED | OUTPATIENT
Start: 2019-04-04 | End: 2019-04-04 | Stop reason: HOSPADM

## 2019-04-04 RX ORDER — PROPOFOL 10 MG/ML
INJECTION, EMULSION INTRAVENOUS AS NEEDED
Status: DISCONTINUED | OUTPATIENT
Start: 2019-04-04 | End: 2019-04-04 | Stop reason: HOSPADM

## 2019-04-04 RX ORDER — HYDROMORPHONE HYDROCHLORIDE 2 MG/ML
0.5 INJECTION, SOLUTION INTRAMUSCULAR; INTRAVENOUS; SUBCUTANEOUS
Status: DISCONTINUED | OUTPATIENT
Start: 2019-04-04 | End: 2019-04-04 | Stop reason: HOSPADM

## 2019-04-04 RX ORDER — FLUMAZENIL 0.1 MG/ML
0.2 INJECTION INTRAVENOUS
Status: DISCONTINUED | OUTPATIENT
Start: 2019-04-04 | End: 2019-04-04 | Stop reason: HOSPADM

## 2019-04-04 RX ORDER — SODIUM CHLORIDE, SODIUM LACTATE, POTASSIUM CHLORIDE, CALCIUM CHLORIDE 600; 310; 30; 20 MG/100ML; MG/100ML; MG/100ML; MG/100ML
125 INJECTION, SOLUTION INTRAVENOUS CONTINUOUS
Status: DISCONTINUED | OUTPATIENT
Start: 2019-04-04 | End: 2019-04-04 | Stop reason: HOSPADM

## 2019-04-04 RX ORDER — ACETAMINOPHEN 10 MG/ML
1000 INJECTION, SOLUTION INTRAVENOUS
Status: COMPLETED | OUTPATIENT
Start: 2019-04-04 | End: 2019-04-04

## 2019-04-04 RX ORDER — NALOXONE HYDROCHLORIDE 0.4 MG/ML
0.1 INJECTION, SOLUTION INTRAMUSCULAR; INTRAVENOUS; SUBCUTANEOUS AS NEEDED
Status: DISCONTINUED | OUTPATIENT
Start: 2019-04-04 | End: 2019-04-04 | Stop reason: HOSPADM

## 2019-04-04 RX ORDER — DEXAMETHASONE SODIUM PHOSPHATE 4 MG/ML
INJECTION, SOLUTION INTRA-ARTICULAR; INTRALESIONAL; INTRAMUSCULAR; INTRAVENOUS; SOFT TISSUE AS NEEDED
Status: DISCONTINUED | OUTPATIENT
Start: 2019-04-04 | End: 2019-04-04 | Stop reason: HOSPADM

## 2019-04-04 RX ORDER — ONDANSETRON 2 MG/ML
INJECTION INTRAMUSCULAR; INTRAVENOUS AS NEEDED
Status: DISCONTINUED | OUTPATIENT
Start: 2019-04-04 | End: 2019-04-04 | Stop reason: HOSPADM

## 2019-04-04 RX ORDER — LIDOCAINE HYDROCHLORIDE 20 MG/ML
INJECTION, SOLUTION EPIDURAL; INFILTRATION; INTRACAUDAL; PERINEURAL AS NEEDED
Status: DISCONTINUED | OUTPATIENT
Start: 2019-04-04 | End: 2019-04-04 | Stop reason: HOSPADM

## 2019-04-04 RX ORDER — SODIUM CHLORIDE 0.9 % (FLUSH) 0.9 %
5-40 SYRINGE (ML) INJECTION AS NEEDED
Status: DISCONTINUED | OUTPATIENT
Start: 2019-04-04 | End: 2019-04-04 | Stop reason: HOSPADM

## 2019-04-04 RX ORDER — MIDAZOLAM HYDROCHLORIDE 1 MG/ML
INJECTION, SOLUTION INTRAMUSCULAR; INTRAVENOUS AS NEEDED
Status: DISCONTINUED | OUTPATIENT
Start: 2019-04-04 | End: 2019-04-04 | Stop reason: HOSPADM

## 2019-04-04 RX ORDER — LIDOCAINE HYDROCHLORIDE AND EPINEPHRINE 10; 10 MG/ML; UG/ML
INJECTION, SOLUTION INFILTRATION; PERINEURAL AS NEEDED
Status: DISCONTINUED | OUTPATIENT
Start: 2019-04-04 | End: 2019-04-04 | Stop reason: HOSPADM

## 2019-04-04 RX ORDER — SODIUM CHLORIDE 0.9 % (FLUSH) 0.9 %
5-40 SYRINGE (ML) INJECTION EVERY 8 HOURS
Status: DISCONTINUED | OUTPATIENT
Start: 2019-04-04 | End: 2019-04-04 | Stop reason: HOSPADM

## 2019-04-04 RX ORDER — OXYMETAZOLINE HCL 0.05 %
SPRAY, NON-AEROSOL (ML) NASAL AS NEEDED
Status: DISCONTINUED | OUTPATIENT
Start: 2019-04-04 | End: 2019-04-04 | Stop reason: HOSPADM

## 2019-04-04 RX ORDER — SODIUM CHLORIDE, SODIUM LACTATE, POTASSIUM CHLORIDE, CALCIUM CHLORIDE 600; 310; 30; 20 MG/100ML; MG/100ML; MG/100ML; MG/100ML
1000 INJECTION, SOLUTION INTRAVENOUS CONTINUOUS
Status: DISCONTINUED | OUTPATIENT
Start: 2019-04-04 | End: 2019-04-04 | Stop reason: HOSPADM

## 2019-04-04 RX ORDER — MAGNESIUM SULFATE 100 %
4 CRYSTALS MISCELLANEOUS AS NEEDED
Status: DISCONTINUED | OUTPATIENT
Start: 2019-04-04 | End: 2019-04-04 | Stop reason: HOSPADM

## 2019-04-04 RX ORDER — CEFAZOLIN SODIUM 2 G/50ML
2 SOLUTION INTRAVENOUS ONCE
Status: COMPLETED | OUTPATIENT
Start: 2019-04-04 | End: 2019-04-04

## 2019-04-04 RX ORDER — OXYMETAZOLINE HCL 0.05 %
3 SPRAY, NON-AEROSOL (ML) NASAL
Status: COMPLETED | OUTPATIENT
Start: 2019-04-04 | End: 2019-04-04

## 2019-04-04 RX ORDER — FENTANYL CITRATE 50 UG/ML
INJECTION, SOLUTION INTRAMUSCULAR; INTRAVENOUS AS NEEDED
Status: DISCONTINUED | OUTPATIENT
Start: 2019-04-04 | End: 2019-04-04 | Stop reason: HOSPADM

## 2019-04-04 RX ADMIN — ONDANSETRON 4 MG: 2 INJECTION INTRAMUSCULAR; INTRAVENOUS at 12:53

## 2019-04-04 RX ADMIN — PROPOFOL 150 MG: 10 INJECTION, EMULSION INTRAVENOUS at 12:39

## 2019-04-04 RX ADMIN — SODIUM CHLORIDE, SODIUM LACTATE, POTASSIUM CHLORIDE, AND CALCIUM CHLORIDE 125 ML/HR: 600; 310; 30; 20 INJECTION, SOLUTION INTRAVENOUS at 12:19

## 2019-04-04 RX ADMIN — FENTANYL CITRATE 25 MCG: 50 INJECTION, SOLUTION INTRAMUSCULAR; INTRAVENOUS at 12:43

## 2019-04-04 RX ADMIN — MIDAZOLAM HYDROCHLORIDE 2 MG: 1 INJECTION, SOLUTION INTRAMUSCULAR; INTRAVENOUS at 12:32

## 2019-04-04 RX ADMIN — DEXAMETHASONE SODIUM PHOSPHATE 8 MG: 4 INJECTION, SOLUTION INTRA-ARTICULAR; INTRALESIONAL; INTRAMUSCULAR; INTRAVENOUS; SOFT TISSUE at 12:46

## 2019-04-04 RX ADMIN — LIDOCAINE HYDROCHLORIDE 60 MG: 20 INJECTION, SOLUTION EPIDURAL; INFILTRATION; INTRACAUDAL; PERINEURAL at 12:39

## 2019-04-04 RX ADMIN — FENTANYL CITRATE 25 MCG: 50 INJECTION, SOLUTION INTRAMUSCULAR; INTRAVENOUS at 13:19

## 2019-04-04 RX ADMIN — ACETAMINOPHEN 1000 MG: 10 INJECTION, SOLUTION INTRAVENOUS at 12:49

## 2019-04-04 RX ADMIN — CEFAZOLIN SODIUM 2 G: 2 SOLUTION INTRAVENOUS at 12:32

## 2019-04-04 RX ADMIN — FENTANYL CITRATE 25 MCG: 50 INJECTION, SOLUTION INTRAMUSCULAR; INTRAVENOUS at 12:51

## 2019-04-04 RX ADMIN — FENTANYL CITRATE 25 MCG: 50 INJECTION, SOLUTION INTRAMUSCULAR; INTRAVENOUS at 12:56

## 2019-04-04 RX ADMIN — EPHEDRINE SULFATE 10 MG: 50 INJECTION, SOLUTION INTRAVENOUS at 13:11

## 2019-04-04 RX ADMIN — EPHEDRINE SULFATE 5 MG: 50 INJECTION, SOLUTION INTRAVENOUS at 13:09

## 2019-04-04 RX ADMIN — OXYMETAZOLINE HYDROCHLORIDE 3 SPRAY: 5 SPRAY NASAL at 12:31

## 2019-04-04 NOTE — DISCHARGE INSTRUCTIONS
Patient armband removed and shredded    DISCHARGE SUMMARY from Nurse    PATIENT INSTRUCTIONS:    After general anesthesia or intravenous sedation, for 24 hours or while taking prescription Narcotics:  · Limit your activities  · Do not drive and operate hazardous machinery  · Do not make important personal or business decisions  · Do  not drink alcoholic beverages  · If you have not urinated within 8 hours after discharge, please contact your surgeon on call. Report the following to your surgeon:  · Excessive pain, swelling, redness or odor of or around the surgical area  · Temperature over 100.5  · Nausea and vomiting lasting longer than 4 hours or if unable to take medications  · Any signs of decreased circulation or nerve impairment to extremity: change in color, persistent  numbness, tingling, coldness or increase pain  · Any questions    What to do at Home:  Recommended activity: Activity as tolerated and no driving for today and Ambulate in house,     If you experience any of the following symptoms above, please follow up with Dr. Jamey Childress. *  Please give a list of your current medications to your Primary Care Provider. *  Please update this list whenever your medications are discontinued, doses are      changed, or new medications (including over-the-counter products) are added. *  Please carry medication information at all times in case of emergency situations. These are general instructions for a healthy lifestyle:    No smoking/ No tobacco products/ Avoid exposure to second hand smoke  Surgeon General's Warning:  Quitting smoking now greatly reduces serious risk to your health.     Obesity, smoking, and sedentary lifestyle greatly increases your risk for illness    A healthy diet, regular physical exercise & weight monitoring are important for maintaining a healthy lifestyle    You may be retaining fluid if you have a history of heart failure or if you experience any of the following symptoms: Weight gain of 3 pounds or more overnight or 5 pounds in a week, increased swelling in our hands or feet or shortness of breath while lying flat in bed. Please call your doctor as soon as you notice any of these symptoms; do not wait until your next office visit. Recognize signs and symptoms of STROKE:    F-face looks uneven    A-arms unable to move or move unevenly    S-speech slurred or non-existent    T-time-call 911 as soon as signs and symptoms begin-DO NOT go       Back to bed or wait to see if you get better-TIME IS BRAIN. Warning Signs of HEART ATTACK     Call 911 if you have these symptoms:   Chest discomfort. Most heart attacks involve discomfort in the center of the chest that lasts more than a few minutes, or that goes away and comes back. It can feel like uncomfortable pressure, squeezing, fullness, or pain.  Discomfort in other areas of the upper body. Symptoms can include pain or discomfort in one or both arms, the back, neck, jaw, or stomach.  Shortness of breath with or without chest discomfort.  Other signs may include breaking out in a cold sweat, nausea, or lightheadedness. Don't wait more than five minutes to call 911 - MINUTES MATTER! Fast action can save your life. Calling 911 is almost always the fastest way to get lifesaving treatment. Emergency Medical Services staff can begin treatment when they arrive -- up to an hour sooner than if someone gets to the hospital by car. The discharge information has been reviewed with the patient and caregiver. The patient and caregiver verbalized understanding. Discharge medications reviewed with the patient and caregiver and appropriate educational materials and side effects teaching were provided.   ___________________________________________________________________________________________________________________________________

## 2019-04-04 NOTE — ANESTHESIA PREPROCEDURE EVALUATION
Relevant Problems No relevant active problems Anesthetic History No history of anesthetic complications Review of Systems / Medical History Patient summary reviewed, nursing notes reviewed and pertinent labs reviewed Pulmonary Smoker Asthma : well controlled Neuro/Psych  
 
seizures: well controlled Psychiatric history Cardiovascular Hypertension: well controlled Exercise tolerance: >4 METS 
  
GI/Hepatic/Renal 
  
GERD: well controlled Hepatitis: type C Liver disease Endo/Other Arthritis Pertinent negatives: No diabetes, hypothyroidism and hyperthyroidism Other Findings Physical Exam 
 
Airway Mallampati: II 
TM Distance: 4 - 6 cm Neck ROM: normal range of motion Mouth opening: Normal 
 
Comments: overbite Cardiovascular Rhythm: regular Rate: normal 
 
 
 
 Dental 
No notable dental hx Pulmonary Breath sounds clear to auscultation Abdominal 
GI exam deferred Other Findings Anesthetic Plan ASA: 3 Anesthesia type: general 
 
 
 
 
Induction: Intravenous Anesthetic plan and risks discussed with: Patient

## 2019-04-04 NOTE — BRIEF OP NOTE
BRIEF OPERATIVE NOTE Date of Procedure: 4/4/2019 Preoperative Diagnosis: NASAL TURBINATE HYPERTROPHY, NASAL CONGESTION Postoperative Diagnosis: NASAL TURBINATE HYPERTROPHY, NASAL CONGESTION Procedure(s): 
SEPTOPLASTY,SUBMUCOUSAL RESECTION TURBINATE **SPEC POP** Surgeon(s) and Role: 
   Clarisa Sorto DO - Primary Surgical Assistant: n/a Surgical Staff: 
Circ-1: Deandre Narvaez RN Scrub Tech-1: Cecilia Fuel Surg Asst-1: Ayush Caulk Event Time In Time Out Incision Start  Incision Close 1314 Anesthesia: General  
Estimated Blood Loss: 25ml Specimens: * No specimens in log * Findings: see op report Complications: none Implants: * No implants in log *

## 2019-04-04 NOTE — PERIOP NOTES
Reviewed PTA medication list with patient/caregiver and patient/caregiver denies any additional medications. Patient admits to having a responsible adult care for them for at least 24 hours after surgery. 
  
Dual skin assessment completed by Alek Barry RN and Ashwini Tran RN.

## 2019-04-04 NOTE — ANESTHESIA POSTPROCEDURE EVALUATION
. 
Post-Anesthesia Evaluation & Assessment Visit Vitals /87 Pulse 80 Temp 36.3 °C (97.3 °F) Resp 16 Ht 5' 2\" (1.575 m) Wt 63.7 kg (140 lb 7 oz) SpO2 96% BMI 25.69 kg/m² Nausea/Vomiting: no nausea Post-operative hydration adequate. Pain score (VAS): 0 Mental status & Level of consciousness: alert and oriented x 3 Neurological status: moves all extremities, sensation grossly intact Pulmonary status: airway patent, no supplemental oxygen required Complications related to anesthesia: none Additional comments:

## 2019-04-05 NOTE — OP NOTES
[facility]  OPERATIVE REPORT    Name:  Mahi Paniagua  MR#:   [id number]  :  1965  ACCOUNT #:  [de-identified]  DATE OF SERVICE:  2019    PREOPERATIVE DIAGNOSES:  1.  Nasal septal infarction. 2.  Inferior turbinate hypertrophy. 3.  Nasal congestion. 4.  Sinus headaches. POSTOPERATIVE DIAGNOSES:  1.  Nasal septal infarction. 2.  Inferior turbinate hypertrophy. 3.  Nasal congestion. 4.  Sinus headaches. PROCEDURE PERFORMED:  1.  Nasal septoplasty. 2.  Bilateral inferior turbinate submucosal resection of turbinates. SURGEON:  Jose Jones DO    ASSISTANT:  No assistants. ANESTHESIA:  General, laryngeal mask airway. COMPLICATIONS:  None. SPECIMENS REMOVED:  None. IMPLANTS:  None. DRAINS:  Bilateral Weaver splints. ESTIMATED BLOOD LOSS:  25 mL. INDICATION FOR OPERATION:  This is a 68-year-old female with the above diagnoses. She has failed medical therapy including topical nasal sprays. The risks, benefits, and alternatives of the operative intervention were discussed with the patient preoperatively. She stated her understanding and desired to proceed. PROCEDURE:  The patient was brought into the operating room and placed supine on the operating table. After induction of general anaesthetic, she was rotated 90 degrees. A surgical pause was performed and topical 4% cocaine was placed in the nasal cavity and allowed to sit for several minutes. Xylocaine 1% with 100,000 epinephrine was then used to inject the bilateral mucoperichondrial flaps in the nasal septum. The patient was then prepped and draped in standard sinus surgery fashion. A #15 blade was used to incise the left hemitransfixion incision. A left mucoperichondrial flap was elevated. The bony cartilaginous junction was taken down with a [place] elevator. The right posterior mucoperichondrial flap was elevated.   Double-action scissor was then used to incise the posterior septum and removed with Nathaneil [place] forceps. Double-action rongeur was used for high posterior spurring. Attention was directed to the floor of the nose where the maxillary crest was dissected and subtotally resected. The quadrangular cartilage was not resected. The remnant sat well within the midline of the nose. A small portion of the caudal septum was back dissected, postage stamped and then ultimately treated with a whipstitch at a later time due to a small deflection to the left hand naris. Attention was directed to the inferior turbinates which were injected with 1% Xylocaine with 1:200,000 epinephrine. The Olympus submucosal resection inferior turbinate blade was then brought on the field and standard submucosal resection of the turbinates with this device at RPMs of 1500 were then performed. A small amount of bipolar cautery was performed. At the end of the procedure, on the inferior turbinates, the nasal cavity was suctioned. The inferior turbinates were therapeutically fractured laterally using a [place] elevator, and then [place] splints dressed in bacitracin were placed in the nasal cavity and sewn into place with a 2-0 Prolene suture. Just prior to this, the hemitransfixion incision was closed with a 4-0 chromic gut suture and whipstitched in place for the 4-0 plain gut suture. The patient was then given back to Anesthesia, awoken in the operating room without difficulty, and transferred to the PACU with stable vital signs.       [facility]      [id number]  D:  04/04/2019 13:52  T:  04/05/2019 1:30  JOB #:  [id number]

## 2021-09-27 ENCOUNTER — OFFICE VISIT (OUTPATIENT)
Dept: FAMILY MEDICINE CLINIC | Age: 56
End: 2021-09-27
Payer: MEDICAID

## 2021-09-27 VITALS
OXYGEN SATURATION: 98 % | HEART RATE: 82 BPM | WEIGHT: 153.13 LBS | BODY MASS INDEX: 28.18 KG/M2 | TEMPERATURE: 97.5 F | RESPIRATION RATE: 14 BRPM | HEIGHT: 62 IN | SYSTOLIC BLOOD PRESSURE: 113 MMHG | DIASTOLIC BLOOD PRESSURE: 72 MMHG

## 2021-09-27 DIAGNOSIS — M05.722 RHEUMATOID ARTHRITIS INVOLVING LEFT ELBOW WITH POSITIVE RHEUMATOID FACTOR (HCC): Primary | ICD-10-CM

## 2021-09-27 DIAGNOSIS — F17.209 TOBACCO USE DISORDER, CONTINUOUS: ICD-10-CM

## 2021-09-27 DIAGNOSIS — Z23 ENCOUNTER FOR IMMUNIZATION: ICD-10-CM

## 2021-09-27 PROCEDURE — 90471 IMMUNIZATION ADMIN: CPT | Performed by: FAMILY MEDICINE

## 2021-09-27 PROCEDURE — 90694 VACC AIIV4 NO PRSRV 0.5ML IM: CPT | Performed by: FAMILY MEDICINE

## 2021-09-27 PROCEDURE — 99203 OFFICE O/P NEW LOW 30 MIN: CPT | Performed by: FAMILY MEDICINE

## 2021-09-27 RX ORDER — LIDOCAINE 50 MG/G
PATCH TOPICAL
COMMUNITY
Start: 2021-08-11 | End: 2021-09-27 | Stop reason: SDUPTHER

## 2021-09-27 RX ORDER — CYCLOBENZAPRINE HCL 5 MG
TABLET ORAL
COMMUNITY
Start: 2021-08-27 | End: 2022-04-18 | Stop reason: ALTCHOICE

## 2021-09-27 RX ORDER — ONDANSETRON 4 MG/1
TABLET, ORALLY DISINTEGRATING ORAL
COMMUNITY
Start: 2021-09-08 | End: 2021-09-27 | Stop reason: SDUPTHER

## 2021-09-27 RX ORDER — GABAPENTIN 600 MG/1
600 TABLET ORAL 3 TIMES DAILY
COMMUNITY
Start: 2020-11-16 | End: 2021-09-27 | Stop reason: SDUPTHER

## 2021-09-27 RX ORDER — GABAPENTIN 600 MG/1
600 TABLET ORAL DAILY
Qty: 90 TABLET | Refills: 0
Start: 2021-09-27

## 2021-09-27 RX ORDER — AMOXICILLIN AND CLAVULANATE POTASSIUM 875; 125 MG/1; MG/1
1 TABLET, FILM COATED ORAL 2 TIMES DAILY
COMMUNITY
Start: 2021-06-29 | End: 2022-04-18 | Stop reason: ALTCHOICE

## 2021-09-27 RX ORDER — LIDOCAINE 50 MG/G
PATCH TOPICAL
Qty: 30 EACH | Refills: 5 | Status: SHIPPED | OUTPATIENT
Start: 2021-09-27 | End: 2022-04-18 | Stop reason: SDUPTHER

## 2021-09-27 RX ORDER — ALBUTEROL SULFATE 90 UG/1
AEROSOL, METERED RESPIRATORY (INHALATION)
COMMUNITY
Start: 2021-08-11 | End: 2022-07-06 | Stop reason: SDUPTHER

## 2021-09-27 RX ORDER — MELOXICAM 7.5 MG/1
7.5 TABLET ORAL DAILY
COMMUNITY
Start: 2021-05-12 | End: 2022-04-18 | Stop reason: ALTCHOICE

## 2021-09-27 RX ORDER — HYDROXYCHLOROQUINE SULFATE 200 MG/1
400 TABLET, FILM COATED ORAL DAILY
COMMUNITY
Start: 2021-08-18 | End: 2022-07-06 | Stop reason: ALTCHOICE

## 2021-09-27 RX ORDER — ONDANSETRON 4 MG/1
TABLET, ORALLY DISINTEGRATING ORAL
Qty: 10 TABLET | Refills: 0 | Status: SHIPPED | OUTPATIENT
Start: 2021-09-27 | End: 2022-01-21

## 2021-09-27 NOTE — PROGRESS NOTES
1. Have you been to the ER, urgent care clinic since your last visit? Hospitalized since your last visit? Yes - 8/27/21 [facility] - Knee pain     2. Have you seen or consulted any other health care providers outside of the [address] [place] [address] since your last visit? Include any pap smears or colon screening. Yes - 9/17/21 - [facility], NP - Adalberto King  8/11/21 9/27/2021      Chief Complaint   Patient presents with    Establish Care         History of Present Illness:        Osmin Li is a 60 y.o. female presents to [facility]. Previously followed by [facility] in [facility], now living in [place]. Hx of seropositive RA on plaquenil, other more non specific musculoskeletal complaints. Takes gabapentin prn. Long standing smoker with COPD in problem list, also on HCTZ at some point for HTN, and takes a statin. Allergies   Allergen Reactions    Phenobarbital Swelling     Organs swell    Dilantin [Phenytoin Sodium Extended] Swelling       Current Outpatient Medications   Medication Sig    MELATONIN PO Take  by mouth.  Ventolin HFA 90 mcg/actuation inhaler INHALE 1 TO 2 PUFFS BY MOUTH EVERY 4 HOURS AS NEEDED FOR WHEEZING OR SHORTNESS OF BREATH    cyclobenzaprine (FLEXERIL) 5 mg tablet     hydrOXYchloroQUINE (PLAQUENIL) 200 mg tablet Take 400 mg by mouth daily.  meloxicam (MOBIC) 7.5 mg tablet Take 7.5 mg by mouth daily.  amoxicillin-clavulanate (AUGMENTIN) 875-125 mg per tablet Take 1 Tablet by mouth two (2) times a day.  lidocaine (LIDODERM) 5 % Apply 1 patch as directed for 12 hours every 24 hours (12 hours on, 12 hours off)    gabapentin (NEURONTIN) 600 mg tablet Take 1 Tablet by mouth daily.  Max Daily Amount: 600 mg.    ondansetron (ZOFRAN ODT) 4 mg disintegrating tablet DISSOLVE 2 TABLETS ON THE TONGUE EVERY 8 HOURS AS NEEDED FOR NAUSEA    rosuvastatin (CRESTOR) 10 mg tablet TAKE ONE TABLET BY MOUTH AT BEDTIME     omeprazole (PRILOSEC) 20 mg capsule  montelukast (SINGULAIR) 10 mg tablet      No current facility-administered medications for this visit. Physical Examination:    Visit Vitals  /72 (BP 1 Location: Right arm, BP Patient Position: Sitting, BP Cuff Size: Adult)   Pulse 82   Temp 97.5 °F (36.4 °C) (Oral)   Resp 14   Ht 5' 1.5\" (1.562 m)   Wt 153 lb 2 oz (69.5 kg)   LMP 06/02/2009 (Approximate)   SpO2 98%   BMI 28.46 kg/m²      General:  Alert, cooperative, no distress. HEENT:  Normocephalic, without obvious abnormality, atraumatic. Conjunctivae/corneas clear. Pupils equal, round, reactive to light. Extraocular movements intact. TMs and external canals normal bilaterally. Nasal mucosa and oropharynx clear. Lungs: Clear to auscultation bilaterally. Chest wall:  No tenderness or deformity. Heart:  Regular rate and rhythm, S1, S2 normal, no murmur, click, rub, or gallop. Abdomen:   Soft, non-tender. Bowel sounds normal. No masses. No organomegaly. Extremities: Extremities normal, atraumatic, no cyanosis or edema. Pulses: 2+ and symmetric all extremities. Skin: Skin color, texture, turgor normal. No rashes or lesions. Lymph nodes: Cervical, supraclavicular, and axillary nodes normal.   Neurologic: CNII-XII intact. Normal strength, sensation, and reflexes throughout. ASSESSMENT AND PLAN    1. Rheumatoid arthritis involving left elbow with positive rheumatoid factor (HCC)    - lidocaine (LIDODERM) 5 %; Apply 1 patch as directed for 12 hours every 24 hours (12 hours on, 12 hours off)  Dispense: 30 Each; Refill: 5  - gabapentin (NEURONTIN) 600 mg tablet; Take 1 Tablet by mouth daily. Max Daily Amount: 600 mg. Dispense: 90 Tablet; Refill: 0    2. Encounter for immunization    - FLU (FLUAD QUAD INFLUENZA VACCINE,QUAD,ADJUVANTED)    3. Tobacco use disorder, continuous  Encouraged to stop smoking.         Orders Placed This Encounter    FLU (FLUAD QUAD INFLUENZA VACCINE,QUAD,ADJUVANTED)    DISCONTD: GABAPENTIN PO     Sig: Take  by mouth.  MELATONIN PO     Sig: Take  by mouth.  Ventolin HFA 90 mcg/actuation inhaler     Sig: INHALE 1 TO 2 PUFFS BY MOUTH EVERY 4 HOURS AS NEEDED FOR WHEEZING OR SHORTNESS OF BREATH    cyclobenzaprine (FLEXERIL) 5 mg tablet    DISCONTD: gabapentin (NEURONTIN) 600 mg tablet     Sig: Take 600 mg by mouth three (3) times daily.  hydrOXYchloroQUINE (PLAQUENIL) 200 mg tablet     Sig: Take 400 mg by mouth daily.  DISCONTD: lidocaine (LIDODERM) 5 %     Sig: Apply 1 patch as directed for 12 hours every 24 hours (12 hours on, 12 hours off)    meloxicam (MOBIC) 7.5 mg tablet     Sig: Take 7.5 mg by mouth daily.  amoxicillin-clavulanate (AUGMENTIN) 875-125 mg per tablet     Sig: Take 1 Tablet by mouth two (2) times a day.  DISCONTD: ondansetron (ZOFRAN ODT) 4 mg disintegrating tablet     Sig: DISSOLVE 2 TABLETS ON THE TONGUE EVERY 8 HOURS AS NEEDED FOR NAUSEA    lidocaine (LIDODERM) 5 %     Sig: Apply 1 patch as directed for 12 hours every 24 hours (12 hours on, 12 hours off)     Dispense:  30 Each     Refill:  5    gabapentin (NEURONTIN) 600 mg tablet     Sig: Take 1 Tablet by mouth daily. Max Daily Amount: 600 mg.      Dispense:  90 Tablet     Refill:  0    ondansetron (ZOFRAN ODT) 4 mg disintegrating tablet     Sig: DISSOLVE 2 TABLETS ON THE TONGUE EVERY 8 HOURS AS NEEDED FOR NAUSEA     Dispense:  10 Tablet     Refill:  0       RTC 6 months    Wilner Nixon MD

## 2022-01-04 NOTE — INTERVAL H&P NOTE
H&P Update: 
Salbador Chisholm was seen and examined. History and physical has been reviewed. The patient has been examined. There have been no significant clinical changes since the completion of the originally dated History and Physical. 
 
Signed By: Kassandra Mosquera DO  April 4, 2019 12:28 PM   
 
 Additional Notes: Patient consent was obtained to proceed with the visit and recommended plan of care after discussion of all risks and benefits, including the risks of COVID-19 exposure. Detail Level: Simple Render Risk Assessment In Note?: no

## 2022-01-21 RX ORDER — ONDANSETRON 4 MG/1
TABLET, ORALLY DISINTEGRATING ORAL
Qty: 10 TABLET | Refills: 0 | Status: SHIPPED | OUTPATIENT
Start: 2022-01-21 | End: 2022-04-18 | Stop reason: SDUPTHER

## 2022-03-08 ENCOUNTER — TELEPHONE (OUTPATIENT)
Dept: FAMILY MEDICINE CLINIC | Age: 57
End: 2022-03-08

## 2022-03-08 DIAGNOSIS — M05.722 RHEUMATOID ARTHRITIS INVOLVING LEFT ELBOW WITH POSITIVE RHEUMATOID FACTOR (HCC): Primary | ICD-10-CM

## 2022-03-08 RX ORDER — DICLOFENAC SODIUM 10 MG/G
GEL TOPICAL 4 TIMES DAILY
Qty: 200 G | Refills: 1 | Status: SHIPPED | OUTPATIENT
Start: 2022-03-08

## 2022-03-08 NOTE — TELEPHONE ENCOUNTER
Pt states that her lidocaine patches are not working well. She is wondering if there is a stronger patch she can use either lidocaine or a different medication.     Best number for call back is 21

## 2022-03-18 PROBLEM — Z72.0 TOBACCO USE: Status: ACTIVE | Noted: 2018-05-14

## 2022-03-18 PROBLEM — Z90.49 S/P CHOLECYSTECTOMY: Status: ACTIVE | Noted: 2018-05-14

## 2022-03-18 PROBLEM — K21.9 GERD (GASTROESOPHAGEAL REFLUX DISEASE): Status: ACTIVE | Noted: 2018-05-14

## 2022-03-19 PROBLEM — B18.2 CHRONIC HEPATITIS C (HCC): Status: ACTIVE | Noted: 2018-05-14

## 2022-03-19 PROBLEM — F10.10 ALCOHOL ABUSE: Status: ACTIVE | Noted: 2018-05-14

## 2022-03-20 PROBLEM — M06.9 CHRONIC RHEUMATIC ARTHRITIS (HCC): Status: ACTIVE | Noted: 2018-05-14

## 2022-03-20 PROBLEM — M85.80 OSTEOPENIA: Status: ACTIVE | Noted: 2018-05-14

## 2022-04-18 ENCOUNTER — VIRTUAL VISIT (OUTPATIENT)
Dept: FAMILY MEDICINE CLINIC | Age: 57
End: 2022-04-18

## 2022-04-18 DIAGNOSIS — M05.722 RHEUMATOID ARTHRITIS INVOLVING LEFT ELBOW WITH POSITIVE RHEUMATOID FACTOR (HCC): ICD-10-CM

## 2022-04-18 PROCEDURE — 99213 OFFICE O/P EST LOW 20 MIN: CPT | Performed by: FAMILY MEDICINE

## 2022-04-18 RX ORDER — ONDANSETRON 4 MG/1
4 TABLET, ORALLY DISINTEGRATING ORAL
Qty: 10 TABLET | Refills: 1 | Status: SHIPPED | OUTPATIENT
Start: 2022-04-18 | End: 2022-07-06 | Stop reason: SDUPTHER

## 2022-04-18 RX ORDER — MONTELUKAST SODIUM 10 MG/1
10 TABLET ORAL DAILY
Qty: 90 TABLET | Refills: 1 | Status: SHIPPED | OUTPATIENT
Start: 2022-04-18 | End: 2022-07-06 | Stop reason: SDUPTHER

## 2022-04-18 RX ORDER — NAPROXEN 500 MG/1
500 TABLET ORAL 2 TIMES DAILY WITH MEALS
Qty: 180 TABLET | Refills: 1 | Status: SHIPPED | OUTPATIENT
Start: 2022-04-18

## 2022-04-18 RX ORDER — AMOXICILLIN 500 MG/1
500 CAPSULE ORAL DAILY
COMMUNITY
Start: 2022-03-31 | End: 2022-07-06 | Stop reason: ALTCHOICE

## 2022-04-18 RX ORDER — MOMETASONE FUROATE 50 UG/1
SPRAY, METERED NASAL
Qty: 90 EACH | Refills: 1 | Status: SHIPPED | OUTPATIENT
Start: 2022-04-18 | End: 2022-07-06 | Stop reason: SDUPTHER

## 2022-04-18 RX ORDER — LIDOCAINE 50 MG/G
PATCH TOPICAL
Qty: 30 EACH | Refills: 5 | Status: SHIPPED | OUTPATIENT
Start: 2022-04-18

## 2022-04-18 RX ORDER — MOMETASONE FUROATE 50 UG/1
SPRAY, METERED NASAL
COMMUNITY
Start: 2022-01-21 | End: 2022-04-18 | Stop reason: SDUPTHER

## 2022-04-18 RX ORDER — OMEPRAZOLE 20 MG/1
20 CAPSULE, DELAYED RELEASE ORAL DAILY
Qty: 90 CAPSULE | Refills: 1 | Status: SHIPPED | OUTPATIENT
Start: 2022-04-18 | End: 2022-07-06 | Stop reason: SDUPTHER

## 2022-04-18 NOTE — PROGRESS NOTES
Anne Coyne is a 60 y.o. female who was seen by synchronous (real-time) audio-video technology on 4/18/2022 for Arthritis (Rheumatoid   -   [phone])        Assessment & Plan:   Diagnoses and all orders for this visit:    1. Rheumatoid arthritis involving left elbow with positive rheumatoid factor (HCC)  -     lidocaine (LIDODERM) 5 %; Apply 1 patch as directed for 12 hours every 24 hours (12 hours on, 12 hours off)  -     naproxen (NAPROSYN) 500 mg tablet; Take 1 Tablet by mouth two (2) times daily (with meals). Other orders  -     omeprazole (PRILOSEC) 20 mg capsule; Take 1 Capsule by mouth daily. -     mometasone (NASONEX) 50 mcg/actuation nasal spray; ADMINISTER 2 SPRAYS IN EACH NOSTRIL ONCE DAILY  -     ondansetron (ZOFRAN ODT) 4 mg disintegrating tablet; Take 1 Tablet by mouth every eight (8) hours as needed for Nausea or Vomiting.  -     montelukast (SINGULAIR) 10 mg tablet; Take 1 Tablet by mouth daily. Subjective:     Needs refills on meds. Having medial epicondyle surgery at [facility] in June. Prior to Admission medications    Medication Sig Start Date End Date Taking? Authorizing Provider   amoxicillin (AMOXIL) 500 mg capsule Take 500 mg by mouth daily. 3/31/22  Yes Provider, Historical   ascorbic acid, vitamin C, 550 mg/1.1 gram (scoop) powd Take  by mouth. Yes Provider, Historical   lidocaine (LIDODERM) 5 % Apply 1 patch as directed for 12 hours every 24 hours (12 hours on, 12 hours off) 4/18/22  Yes Estelita Herrera MD   omeprazole (PRILOSEC) 20 mg capsule Take 1 Capsule by mouth daily. 4/18/22  Yes Estelita Herrera MD   mometasone (NASONEX) 50 mcg/actuation nasal spray ADMINISTER 2 SPRAYS IN [facility] NOSTRIL ONCE DAILY 4/18/22  Yes Estelita Herrera MD   [facility]ron [facility]) 4 mg disintegrating tablet Take 1 Tablet by mouth every eight (8) hours as needed for Nausea or Vomiting.  4/18/22  Yes Estelita Herrera MD   montelukast (SINGULAIR) 10 mg tablet Take 1 Tablet by mouth daily. 4/18/22  Yes Navdeep Tolliver MD   naproxen (NAPROSYN) 500 mg tablet Take 1 Tablet by mouth two (2) times daily (with meals). 4/18/22  Yes Navdeep Tolliver MD   diclofenac (VOLTAREN) 1 % gel Apply  to affected area four (4) times daily. 3/8/22  Yes Navdeep Tolliver MD   MELATONIN PO Take  by mouth. Yes Provider, Historical   Ventolin HFA 90 mcg/actuation inhaler INHALE 1 TO 2 PUFFS BY MOUTH EVERY 4 HOURS AS NEEDED FOR WHEEZING OR SHORTNESS OF BREATH 8/11/21  Yes Provider, Historical   hydrOXYchloroQUINE (PLAQUENIL) 200 mg tablet Take 400 mg by mouth daily. 8/18/21  Yes Provider, Historical   gabapentin (NEURONTIN) 600 mg tablet Take 1 Tablet by mouth daily. Max Daily Amount: 600 mg. Patient not taking: Reported on 4/18/2022 9/27/21   Navdeep Tolliver MD   rosuvastatin (CRESTOR) 10 mg tablet TAKE ONE TABLET BY MOUTH AT BEDTIME   Patient not taking: Reported on 4/18/2022 10/8/18   OBINNA Brooks    Objective:   No flowsheet data found.      [INSTRUCTIONS:  \"[x]\" Indicates a positive item  \"[]\" Indicates a negative item  -- DELETE ALL ITEMS NOT EXAMINED]    Constitutional: [x] Appears well-developed and well-nourished [x] No apparent distress      [] Abnormal -     Mental status: [x] Alert and awake  [x] Oriented to person/place/time [x] Able to follow commands    [] Abnormal -     Eyes:   EOM    [x]  Normal    [] Abnormal -   Sclera  [x]  Normal    [] Abnormal -          Discharge [x]  None visible   [] Abnormal -     HENT: [x] Normocephalic, atraumatic  [] Abnormal -   [x] Mouth/Throat: Mucous membranes are moist    External Ears [x] Normal  [] Abnormal -    Neck: [x] No visualized mass [] Abnormal -     Pulmonary/Chest: [x] Respiratory effort normal   [x] No visualized signs of difficulty breathing or respiratory distress        [] Abnormal -      Musculoskeletal:   [x] Normal gait with no signs of ataxia         [x] Normal range of motion of neck        [] Abnormal -     Neurological:        [x] No Facial Asymmetry (Cranial nerve 7 motor function) (limited exam due to video visit)          [x] No gaze palsy        [] Abnormal -          Skin:        [x] No significant exanthematous lesions or discoloration noted on facial skin         [] Abnormal -            Psychiatric:       [x] Normal Affect [] Abnormal -        [x] No Hallucinations    Other pertinent observable physical exam findings:-        We discussed the expected course, resolution and complications of the diagnosis(es) in detail. Medication risks, benefits, costs, interactions, and alternatives were discussed as indicated. I advised her to contact the office if her condition worsens, changes or fails to improve as anticipated. She expressed understanding with the diagnosis(es) and plan. Parth Choi, was evaluated through a synchronous (real-time) audio-video encounter. The patient (or guardian if applicable) is aware that this is a billable service, which includes applicable co-pays. Verbal consent to proceed has been obtained. The visit was conducted pursuant to the emergency declaration under the [address] [place] [address] authority and the [employer] General Act. Patient identification was verified, and a caregiver was present when appropriate. The patient was located at home in a state where the provider was licensed to provide care. Pasha Lau MD  1. Have you been to the ER, urgent care clinic since your last visit? Hospitalized since your last visit? Yes - 12/11/21 [facility] ER Visit - [facility]    2. Have you seen or consulted any other health care providers outside of the [address] [place] [address] since your last visit? Include any pap smears or colon screening.   [facility]

## 2022-07-06 ENCOUNTER — OFFICE VISIT (OUTPATIENT)
Dept: FAMILY MEDICINE CLINIC | Age: 57
End: 2022-07-06
Payer: MEDICAID

## 2022-07-06 VITALS
OXYGEN SATURATION: 96 % | BODY MASS INDEX: 28.23 KG/M2 | TEMPERATURE: 97.3 F | HEART RATE: 80 BPM | HEIGHT: 62 IN | RESPIRATION RATE: 16 BRPM | WEIGHT: 153.4 LBS | SYSTOLIC BLOOD PRESSURE: 113 MMHG | DIASTOLIC BLOOD PRESSURE: 74 MMHG

## 2022-07-06 DIAGNOSIS — K21.9 GERD WITHOUT ESOPHAGITIS: ICD-10-CM

## 2022-07-06 DIAGNOSIS — J42 CHRONIC BRONCHITIS, UNSPECIFIED CHRONIC BRONCHITIS TYPE (HCC): ICD-10-CM

## 2022-07-06 DIAGNOSIS — Z72.0 TOBACCO USE: ICD-10-CM

## 2022-07-06 DIAGNOSIS — J30.1 NON-SEASONAL ALLERGIC RHINITIS DUE TO POLLEN: Primary | ICD-10-CM

## 2022-07-06 DIAGNOSIS — F17.209 TOBACCO USE DISORDER, CONTINUOUS: ICD-10-CM

## 2022-07-06 DIAGNOSIS — F32.9 REACTIVE DEPRESSION (SITUATIONAL): ICD-10-CM

## 2022-07-06 DIAGNOSIS — R07.9 LEFT-SIDED CHEST PAIN: ICD-10-CM

## 2022-07-06 PROCEDURE — 99214 OFFICE O/P EST MOD 30 MIN: CPT | Performed by: FAMILY MEDICINE

## 2022-07-06 RX ORDER — BUPROPION HYDROCHLORIDE 150 MG/1
150 TABLET ORAL DAILY
Qty: 30 TABLET | Refills: 5 | Status: SHIPPED | OUTPATIENT
Start: 2022-07-06

## 2022-07-06 RX ORDER — MONTELUKAST SODIUM 10 MG/1
10 TABLET ORAL DAILY
Qty: 90 TABLET | Refills: 1 | Status: SHIPPED | OUTPATIENT
Start: 2022-07-06

## 2022-07-06 RX ORDER — ONDANSETRON 4 MG/1
4 TABLET, ORALLY DISINTEGRATING ORAL
Qty: 10 TABLET | Refills: 1 | Status: SHIPPED | OUTPATIENT
Start: 2022-07-06

## 2022-07-06 RX ORDER — FLUTICASONE FUROATE AND VILANTEROL 200; 25 UG/1; UG/1
1 POWDER RESPIRATORY (INHALATION) DAILY
Qty: 60 EACH | Refills: 2 | Status: SHIPPED | OUTPATIENT
Start: 2022-07-06 | End: 2022-07-11

## 2022-07-06 RX ORDER — OMEPRAZOLE 20 MG/1
20 CAPSULE, DELAYED RELEASE ORAL DAILY
Qty: 90 CAPSULE | Refills: 1 | Status: SHIPPED | OUTPATIENT
Start: 2022-07-06

## 2022-07-06 RX ORDER — FLUTICASONE FUROATE AND VILANTEROL 200; 25 UG/1; UG/1
1 POWDER RESPIRATORY (INHALATION) DAILY
COMMUNITY
End: 2022-07-06 | Stop reason: SDUPTHER

## 2022-07-06 RX ORDER — MOMETASONE FUROATE 50 UG/1
SPRAY, METERED NASAL
Qty: 90 EACH | Refills: 1 | Status: SHIPPED | OUTPATIENT
Start: 2022-07-06

## 2022-07-06 RX ORDER — ALBUTEROL SULFATE 90 UG/1
AEROSOL, METERED RESPIRATORY (INHALATION)
Qty: 18 G | Refills: 5 | Status: SHIPPED | OUTPATIENT
Start: 2022-07-06

## 2022-07-06 NOTE — PROGRESS NOTES
1. \"Have you been to the ER, urgent care clinic since your last visit? Hospitalized since your last visit? \" No    2. \"Have you seen or consulted any other health care providers outside of the [address] [place] [address] since your last visit? \" Yes Where: Dr Braydon Amador     3. For patients aged 39-70: Has the patient had a colonoscopy / FIT/ Cologuard? No      If the patient is female:    4. For patients aged 41-77: Has the patient had a mammogram within the past 2 years? No      5. For patients aged 21-65: Has the patient had a pap smear? No     2022      Chief Complaint   Patient presents with    Medication Refill     follow up / (+) depression/suicide screen D/T pt's wife  2 month ago         History of Present Illness:        Eleni Aguirre is a 60 y.o. female returns needing refills on respiratory meds and with worsened mood since the death of her long term partner next month. Suicidal ideation, but no plan. Has been treated for mood in past but can't remember what was effective. Out of omeprazole. Complains of L sided CP with inspiration for about a month. Allergies   Allergen Reactions    Phenobarbital Swelling     Organs swell    Dilantin [Phenytoin Sodium Extended] Swelling       Current Outpatient Medications   Medication Sig    fluticasone furoate-vilanteroL (Breo Ellipta) 200-25 mcg/dose inhaler Take 1 Puff by inhalation daily.  mometasone (NASONEX) 50 mcg/actuation nasal spray ADMINISTER 2 SPRAYS IN EACH NOSTRIL ONCE DAILY    montelukast (SINGULAIR) 10 mg tablet Take 1 Tablet by mouth daily.  omeprazole (PRILOSEC) 20 mg capsule Take 1 Capsule by mouth daily.  Ventolin HFA 90 mcg/actuation inhaler INHALE 1 TO 2 PUFFS BY MOUTH EVERY 4 HOURS AS NEEDED FOR WHEEZING OR SHORTNESS OF BREATH    buPROPion XL (WELLBUTRIN XL) 150 mg tablet Take 1 Tablet by mouth daily.     lidocaine (LIDODERM) 5 % Apply 1 patch as directed for 12 hours every 24 hours (12 hours on, 12 hours off)    ondansetron (ZOFRAN ODT) 4 mg disintegrating tablet Take 1 Tablet by mouth every eight (8) hours as needed for Nausea or Vomiting.  naproxen (NAPROSYN) 500 mg tablet Take 1 Tablet by mouth two (2) times daily (with meals).  amoxicillin (AMOXIL) 500 mg capsule Take 500 mg by mouth daily. (Patient not taking: Reported on 7/6/2022)    ascorbic acid, vitamin C, 550 mg/1.1 gram (scoop) powd Take  by mouth. (Patient not taking: Reported on 7/6/2022)    diclofenac (VOLTAREN) 1 % gel Apply  to affected area four (4) times daily. (Patient not taking: Reported on 7/6/2022)    MELATONIN PO Take  by mouth. (Patient not taking: Reported on 7/6/2022)    hydrOXYchloroQUINE (PLAQUENIL) 200 mg tablet Take 400 mg by mouth daily. (Patient not taking: Reported on 7/6/2022)    gabapentin (NEURONTIN) 600 mg tablet Take 1 Tablet by mouth daily. Max Daily Amount: 600 mg. (Patient not taking: Reported on 4/18/2022)    rosuvastatin (CRESTOR) 10 mg tablet TAKE ONE TABLET BY MOUTH AT BEDTIME  (Patient not taking: Reported on 4/18/2022)     No current facility-administered medications for this visit. Physical Examination:    Visit Vitals  /74 (BP 1 Location: Right arm, BP Patient Position: Sitting, BP Cuff Size: Adult)   Pulse 80   Temp 97.3 °F (36.3 °C) (Temporal)   Resp 16   Ht 5' 1.5\" (1.562 m)   Wt 153 lb 6.4 oz (69.6 kg)   LMP 06/02/2009 (Approximate)   SpO2 96%   BMI 28.52 kg/m²      General:  Alert, cooperative, no distress. HEENT:  Normocephalic, without obvious abnormality, atraumatic. Conjunctivae/corneas clear. Pupils equal, round, reactive to light. Extraocular movements intact. TMs and external canals normal bilaterally. Nasal mucosa and oropharynx clear. Lungs: Clear to auscultation bilaterally. Chest wall:  No tenderness or deformity. Heart:  Regular rate and rhythm, S1, S2 normal, no murmur, click, rub, or gallop. Abdomen:   Soft, non-tender. Bowel sounds normal. No masses. No organomegaly.    Extremities: Extremities normal, atraumatic, no cyanosis or edema. Pulses: 2+ and symmetric all extremities. Skin: Skin color, texture, turgor normal. No rashes or lesions. Lymph nodes: Cervical, supraclavicular, and axillary nodes normal.   Neurologic: CNII-XII intact. Normal strength, sensation, and reflexes throughout. ASSESSMENT AND PLAN    1. Non-seasonal allergic rhinitis due to pollen    - mometasone (NASONEX) 50 mcg/actuation nasal spray; ADMINISTER 2 SPRAYS IN EACH NOSTRIL ONCE DAILY  Dispense: 90 Each; Refill: 1  - montelukast (SINGULAIR) 10 mg tablet; Take 1 Tablet by mouth daily. Dispense: 90 Tablet; Refill: 1    2. GERD without esophagitis    - omeprazole (PRILOSEC) 20 mg capsule; Take 1 Capsule by mouth daily. Dispense: 90 Capsule; Refill: 1    3. Chronic bronchitis, unspecified chronic bronchitis type (HCC)    - Ventolin HFA 90 mcg/actuation inhaler; INHALE 1 TO 2 PUFFS BY MOUTH EVERY 4 HOURS AS NEEDED FOR WHEEZING OR SHORTNESS OF BREATH  Dispense: 18 g; Refill: 5    4. Tobacco use disorder, continuous  Encouraged to stop smoking    5. Tobacco use      6. Reactive depression (situational)  Exacerbated by etoh misuse  - buPROPion XL (WELLBUTRIN XL) 150 mg tablet; Take 1 Tablet by mouth daily. Dispense: 30 Tablet; Refill: 5    7. Left-sided chest pain    - XR CHEST PA LAT; Future            Orders Placed This Encounter    XR CHEST PA LAT     Standing Status:   Future     Standing Expiration Date:   8/6/2023     Order Specific Question:   Reason for Exam     Answer:   left sided chest pain     Order Specific Question:   Which facility to perform procedure? Answer:   [address]    fluticasone furoate-vilanteroL (Breo Ellipta) 200-25 mcg/dose inhaler     Sig: Take 1 Puff by inhalation daily.     mometasone (NASONEX) 50 mcg/actuation nasal spray     Sig: ADMINISTER 2 SPRAYS IN EACH NOSTRIL ONCE DAILY     Dispense:  90 Each     Refill:  1    montelukast (SINGULAIR) 10 mg tablet     Sig: Take 1 Tablet by mouth daily. Dispense:  90 Tablet     Refill:  1    omeprazole (PRILOSEC) 20 mg capsule     Sig: Take 1 Capsule by mouth daily. Dispense:  90 Capsule     Refill:  1    Ventolin HFA 90 mcg/actuation inhaler     Sig: INHALE 1 TO 2 PUFFS BY MOUTH EVERY 4 HOURS AS NEEDED FOR WHEEZING OR SHORTNESS OF BREATH     Dispense:  18 g     Refill:  5    buPROPion XL (WELLBUTRIN XL) 150 mg tablet     Sig: Take 1 Tablet by mouth daily.      Dispense:  30 Tablet     Refill:  Penny Del Castillo MD

## 2022-07-07 ENCOUNTER — TELEPHONE (OUTPATIENT)
Dept: FAMILY MEDICINE CLINIC | Age: 57
End: 2022-07-07

## 2022-07-07 DIAGNOSIS — F32.9 REACTIVE DEPRESSION (SITUATIONAL): ICD-10-CM

## 2022-07-07 DIAGNOSIS — J42 CHRONIC BRONCHITIS, UNSPECIFIED CHRONIC BRONCHITIS TYPE (HCC): Primary | ICD-10-CM

## 2022-07-07 NOTE — TELEPHONE ENCOUNTER
fluticasone furoate-vilanteroL (Breo Ellipta) 200-25 mcg/dose inhaler   requires prior auth.    Kiser: Chhaya Paredes

## 2022-07-11 RX ORDER — BUDESONIDE AND FORMOTEROL FUMARATE DIHYDRATE 80; 4.5 UG/1; UG/1
2 AEROSOL RESPIRATORY (INHALATION) 2 TIMES DAILY
Qty: 10.2 G | Refills: 5 | Status: SHIPPED | OUTPATIENT
Start: 2022-07-11

## 2022-07-11 NOTE — TELEPHONE ENCOUNTER
Laura flores  Key: [id number] claim for [employer] 200-25MCG/INH aerosol powder has been rejected and requires prior authorization for coverage. Non-preferred medications may have a higher patient co-pay than the health insurance plan's preferred medications. If clinically appropriate, you may change the prescription. A therapeutic alternative may be available. Questions on alternatives? Via [employer] at [phone]. You can also review the plan's formulary online or call them directly.  Compare the original medication with possible alternatives:    Drug Name                                                                        PA Requirement*  Breo Ellipta 200-25MCG/INH aerosol powder                        Required  Advair Diskus                                                                                   Not Required  Advair [facility]                                                                                   Not Required  Anoro Ellipta                                                                                   Not Required  Combivent Respimat                                                                       Not Required  [facility]                                                                                               Not Required  Flovent [facility]                                                                                   Not Required  Ipratropium-Albuterol                                                                       Not Required  Spiriva HandiHaler                                                                       Not Required  Stiolto Respimat                                                                       Not Required  AirDuo Digihaler                                                                       Required  AirDuo RespiClick 822/92                                                           Required  AirDuo RespiClick 232/14                                                           Required  AirDuo RespiClick 75/81                                                           Required  Arnuity Ellipta                                                                                   Required  Bevespi Aerosphere                                                                       Required  Breztri Aerosphere                                                                       Required  Budesonide-Formoterol Fumarate                                               Required  Fluticasone Furoate-Vilanterol                                               Required  Incruse Ellipta                                                                                  Required  Spiriva Respimat                                                                      Required  Spiriva Respimat                                                                       Required  Trelegy Ellipta                                                                                  Required

## 2022-07-18 ENCOUNTER — TELEPHONE (OUTPATIENT)
Dept: FAMILY MEDICINE CLINIC | Age: 57
End: 2022-07-18

## 2022-07-18 NOTE — TELEPHONE ENCOUNTER
Please call patient. She is very agitated that no one has called her yet with her xray results.  [phone]

## 2022-08-30 LAB — MAMMOGRAPHY, EXTERNAL: NORMAL

## 2022-11-14 DIAGNOSIS — J30.1 NON-SEASONAL ALLERGIC RHINITIS DUE TO POLLEN: ICD-10-CM

## 2022-11-14 RX ORDER — MONTELUKAST SODIUM 10 MG/1
10 TABLET ORAL DAILY
Qty: 90 TABLET | Refills: 1 | Status: SHIPPED | OUTPATIENT
Start: 2022-11-14

## 2023-03-03 ENCOUNTER — OFFICE VISIT (OUTPATIENT)
Dept: FAMILY MEDICINE CLINIC | Age: 58
End: 2023-03-03

## 2023-03-03 VITALS
SYSTOLIC BLOOD PRESSURE: 126 MMHG | TEMPERATURE: 96.9 F | OXYGEN SATURATION: 97 % | HEIGHT: 62 IN | HEART RATE: 77 BPM | RESPIRATION RATE: 14 BRPM | BODY MASS INDEX: 29.72 KG/M2 | WEIGHT: 161.5 LBS | DIASTOLIC BLOOD PRESSURE: 83 MMHG

## 2023-03-03 DIAGNOSIS — K21.9 GERD WITHOUT ESOPHAGITIS: ICD-10-CM

## 2023-03-03 DIAGNOSIS — E78.2 MIXED HYPERLIPIDEMIA: Primary | ICD-10-CM

## 2023-03-03 DIAGNOSIS — M05.722 RHEUMATOID ARTHRITIS INVOLVING LEFT ELBOW WITH POSITIVE RHEUMATOID FACTOR (HCC): ICD-10-CM

## 2023-03-03 DIAGNOSIS — F10.10 ALCOHOL ABUSE: ICD-10-CM

## 2023-03-03 DIAGNOSIS — F32.9 REACTIVE DEPRESSION (SITUATIONAL): ICD-10-CM

## 2023-03-03 DIAGNOSIS — J01.00 ACUTE NON-RECURRENT MAXILLARY SINUSITIS: ICD-10-CM

## 2023-03-03 RX ORDER — OMEPRAZOLE 20 MG/1
20 CAPSULE, DELAYED RELEASE ORAL DAILY
Qty: 90 CAPSULE | Refills: 1 | Status: SHIPPED | OUTPATIENT
Start: 2023-03-03

## 2023-03-03 RX ORDER — BUPROPION HYDROCHLORIDE 150 MG/1
150 TABLET ORAL DAILY
Qty: 30 TABLET | Refills: 5 | Status: SHIPPED | OUTPATIENT
Start: 2023-03-03 | End: 2023-03-03 | Stop reason: ALTCHOICE

## 2023-03-03 RX ORDER — NALTREXONE HYDROCHLORIDE 50 MG/1
50 TABLET, FILM COATED ORAL DAILY
Qty: 30 TABLET | Refills: 5 | Status: SHIPPED | OUTPATIENT
Start: 2023-03-03

## 2023-03-03 RX ORDER — AMOXICILLIN 500 MG/1
500 CAPSULE ORAL 3 TIMES DAILY
Qty: 30 CAPSULE | Refills: 0 | Status: SHIPPED | OUTPATIENT
Start: 2023-03-03 | End: 2023-03-13

## 2023-03-03 RX ORDER — ROSUVASTATIN CALCIUM 10 MG/1
10 TABLET, COATED ORAL
Qty: 90 TABLET | Refills: 1 | Status: SHIPPED | OUTPATIENT
Start: 2023-03-03

## 2023-03-03 RX ORDER — ONDANSETRON 4 MG/1
4 TABLET, ORALLY DISINTEGRATING ORAL
Qty: 10 TABLET | Refills: 1 | Status: SHIPPED | OUTPATIENT
Start: 2023-03-03

## 2023-03-03 RX ORDER — LIDOCAINE 50 MG/G
PATCH TOPICAL
Qty: 30 EACH | Refills: 5 | Status: SHIPPED | OUTPATIENT
Start: 2023-03-03

## 2023-03-03 NOTE — PROGRESS NOTES
1. \"Have you been to the ER, urgent care clinic since your last visit? Hospitalized since your last visit? \" No    2. \"Have you seen or consulted any other health care providers outside of the [place] [address] since your last visit? \" No     3. For patients aged 39-70: Has the patient had a colonoscopy / FIT/ Cologuard? Yes - no Care Gap present      If the patient is female:    4. For patients aged 41-77: Has the patient had a mammogram within the past 2 years? Yes - no Care Gap present      5. For patients aged 21-65: Has the patient had a pap smear? No      Successful venipuncture in patient's Right AC X 1 sticks. The patient tolerated this procedure w/o c/o pain or discomfort. 3/3/2023      Chief Complaint   Patient presents with    Nausea    Fatigue         History of Present Illness:        Fany Donato is a 59 y.o. female presents after almost 9 months absence. Psychiatric admission in fall for suicidal gesture. Discharged on topamax and depade, taking neither. Still drinking 2-3 beers daily. Allergies   Allergen Reactions    Phenobarbital Swelling     Organs swell    Dilantin [Phenytoin Sodium Extended] Swelling       Current Outpatient Medications   Medication Sig    cpap machine kit by Does Not Apply route. omeprazole (PRILOSEC) 20 mg capsule Take 1 Capsule by mouth daily. ondansetron (ZOFRAN ODT) 4 mg disintegrating tablet Take 1 Tablet by mouth every eight (8) hours as needed for Nausea or Vomiting.    lidocaine (LIDODERM) 5 % Apply 1 patch as directed for 12 hours every 24 hours (12 hours on, 12 hours off)    rosuvastatin (CRESTOR) 10 mg tablet Take 1 Tablet by mouth nightly. amoxicillin (AMOXIL) 500 mg capsule Take 1 Capsule by mouth three (3) times daily for 10 days. naltrexone (DEPADE) 50 mg tablet Take 1 Tablet by mouth daily.     montelukast (SINGULAIR) 10 mg tablet TAKE 1 TABLET BY MOUTH DAILY    budesonide-formoteroL (SYMBICORT) 80-4.5 mcg/actuation HFAA Take 2 Puffs by inhalation two (2) times a day. mometasone (NASONEX) 50 mcg/actuation nasal spray ADMINISTER 2 SPRAYS IN EACH NOSTRIL ONCE DAILY    Ventolin HFA 90 mcg/actuation inhaler INHALE 1 TO 2 PUFFS BY MOUTH EVERY 4 HOURS AS NEEDED FOR WHEEZING OR SHORTNESS OF BREATH    MELATONIN PO Take  by mouth. ascorbic acid, vitamin C, 550 mg/1.1 gram (scoop) powd Take  by mouth. (Patient not taking: No sig reported)     No current facility-administered medications for this visit. Physical Examination:    Visit Vitals  /83 (BP 1 Location: Left upper arm, BP Patient Position: Sitting, BP Cuff Size: Adult)   Pulse 77   Temp 96.9 °F (36.1 °C) (Oral)   Resp 14   Ht 5' 1.5\" (1.562 m)   Wt 161 lb 8 oz (73.3 kg)   LMP 06/02/2009 (Approximate)   SpO2 97%   BMI 30.02 kg/m²      General:  Alert, cooperative, no distress. HEENT:  Normocephalic, without obvious abnormality, atraumatic. Conjunctivae/corneas clear. Pupils equal, round, reactive to light. Extraocular movements intact. TMs and external canals normal bilaterally. Nasal mucosa and oropharynx clear. Lungs: Clear to auscultation bilaterally. Chest wall:  No tenderness or deformity. Heart:  Regular rate and rhythm, S1, S2 normal, no murmur, click, rub, or gallop. Abdomen:   Soft, non-tender. Bowel sounds normal. No masses. No organomegaly. Extremities: Extremities normal, atraumatic, no cyanosis or edema. Pulses: 2+ and symmetric all extremities. Skin: Skin color, texture, turgor normal. No rashes or lesions. Lymph nodes: Cervical, supraclavicular, and axillary nodes normal.   Neurologic: CNII-XII intact. Normal strength, sensation, and reflexes throughout. ASSESSMENT AND PLAN    1. Reactive depression (situational)  Off meds, still self medicating    2. Rheumatoid arthritis involving left elbow with positive rheumatoid factor (HCC)  Stable, off plaquenil  - METABOLIC PANEL, COMPREHENSIVE; Future  - CBC WITH AUTOMATED DIFF;  Future  - lidocaine (LIDODERM) 5 %; Apply 1 patch as directed for 12 hours every 24 hours (12 hours on, 12 hours off)  Dispense: 30 Each; Refill: 5  - CBC WITH AUTOMATED DIFF  - METABOLIC PANEL, COMPREHENSIVE    3. Mixed hyperlipidemia    - CBC WITH AUTOMATED DIFF; Future  - LIPID PANEL; Future  - rosuvastatin (CRESTOR) 10 mg tablet; Take 1 Tablet by mouth nightly. Dispense: 90 Tablet; Refill: 1  - LIPID PANEL  - CBC WITH AUTOMATED DIFF  - AL COLLECTION VENOUS BLOOD VENIPUNCTURE    4. GERD without esophagitis  Encouraged etoh abstinence  - omeprazole (PRILOSEC) 20 mg capsule; Take 1 Capsule by mouth daily. Dispense: 90 Capsule; Refill: 1  - ondansetron (ZOFRAN ODT) 4 mg disintegrating tablet; Take 1 Tablet by mouth every eight (8) hours as needed for Nausea or Vomiting. Dispense: 10 Tablet; Refill: 1    5. Acute non-recurrent maxillary sinusitis    - amoxicillin (AMOXIL) 500 mg capsule; Take 1 Capsule by mouth three (3) times daily for 10 days. Dispense: 30 Capsule; Refill: 0    6. Alcohol abuse    - naltrexone (DEPADE) 50 mg tablet; Take 1 Tablet by mouth daily. Dispense: 30 Tablet; Refill: 5            Orders Placed This Encounter    METABOLIC PANEL, COMPREHENSIVE     Standing Status:   Future     Number of Occurrences:   1     Standing Expiration Date:   3/3/2024    CBC WITH AUTOMATED DIFF     Standing Status:   Future     Number of Occurrences:   1     Standing Expiration Date:   3/3/2024    LIPID PANEL     Standing Status:   Future     Number of Occurrences:   1     Standing Expiration Date:   3/3/2024    COLLECTION VENOUS BLOOD,VENIPUNCTURE    cpap machine kit     Sig: by Does Not Apply route. DISCONTD: buPROPion XL (WELLBUTRIN XL) 150 mg tablet     Sig: Take 1 Tablet by mouth daily. Dispense:  30 Tablet     Refill:  5    omeprazole (PRILOSEC) 20 mg capsule     Sig: Take 1 Capsule by mouth daily.      Dispense:  90 Capsule     Refill:  1    ondansetron (ZOFRAN ODT) 4 mg disintegrating tablet     Sig: Take 1 Tablet by mouth every eight (8) hours as needed for Nausea or Vomiting. Dispense:  10 Tablet     Refill:  1    lidocaine (LIDODERM) 5 %     Sig: Apply 1 patch as directed for 12 hours every 24 hours (12 hours on, 12 hours off)     Dispense:  30 Each     Refill:  5    rosuvastatin (CRESTOR) 10 mg tablet     Sig: Take 1 Tablet by mouth nightly. Dispense:  90 Tablet     Refill:  1    amoxicillin (AMOXIL) 500 mg capsule     Sig: Take 1 Capsule by mouth three (3) times daily for 10 days. Dispense:  30 Capsule     Refill:  0    naltrexone (DEPADE) 50 mg tablet     Sig: Take 1 Tablet by mouth daily.      Dispense:  30 Tablet     Refill:  Jany Billy MD

## 2023-03-04 LAB
ALBUMIN SERPL-MCNC: 3.8 G/DL (ref 3.5–5)
ALBUMIN/GLOB SERPL: 1 (ref 1.1–2.2)
ALP SERPL-CCNC: 49 U/L (ref 45–117)
ALT SERPL-CCNC: 18 U/L (ref 12–78)
ANION GAP SERPL CALC-SCNC: 5 MMOL/L (ref 5–15)
AST SERPL-CCNC: 16 U/L (ref 15–37)
BASOPHILS # BLD: 0 K/UL (ref 0–0.1)
BASOPHILS NFR BLD: 0 % (ref 0–1)
BILIRUB SERPL-MCNC: 0.4 MG/DL (ref 0.2–1)
BUN SERPL-MCNC: 11 MG/DL (ref 6–20)
BUN/CREAT SERPL: 12 (ref 12–20)
CALCIUM SERPL-MCNC: 9.5 MG/DL (ref 8.5–10.1)
CHLORIDE SERPL-SCNC: 105 MMOL/L (ref 97–108)
CHOLEST SERPL-MCNC: 324 MG/DL
CO2 SERPL-SCNC: 28 MMOL/L (ref 21–32)
CREAT SERPL-MCNC: 0.93 MG/DL (ref 0.55–1.02)
DIFFERENTIAL METHOD BLD: NORMAL
EOSINOPHIL # BLD: 0.1 K/UL (ref 0–0.4)
EOSINOPHIL NFR BLD: 1 % (ref 0–7)
ERYTHROCYTE [DISTWIDTH] IN BLOOD BY AUTOMATED COUNT: 13.6 % (ref 11.5–14.5)
GLOBULIN SER CALC-MCNC: 3.8 G/DL (ref 2–4)
GLUCOSE SERPL-MCNC: 104 MG/DL (ref 65–100)
HCT VFR BLD AUTO: 45.1 % (ref 35–47)
HDLC SERPL-MCNC: 56 MG/DL
HDLC SERPL: 5.8 (ref 0–5)
HGB BLD-MCNC: 13.7 G/DL (ref 11.5–16)
IMM GRANULOCYTES # BLD AUTO: 0 K/UL (ref 0–0.04)
IMM GRANULOCYTES NFR BLD AUTO: 0 % (ref 0–0.5)
LDLC SERPL CALC-MCNC: ABNORMAL MG/DL (ref 0–100)
LDLC SERPL DIRECT ASSAY-MCNC: 223 MG/DL (ref 0–100)
LYMPHOCYTES # BLD: 1.7 K/UL (ref 0.8–3.5)
LYMPHOCYTES NFR BLD: 28 % (ref 12–49)
MCH RBC QN AUTO: 28.3 PG (ref 26–34)
MCHC RBC AUTO-ENTMCNC: 30.4 G/DL (ref 30–36.5)
MCV RBC AUTO: 93.2 FL (ref 80–99)
MONOCYTES # BLD: 0.6 K/UL (ref 0–1)
MONOCYTES NFR BLD: 10 % (ref 5–13)
NEUTS SEG # BLD: 3.7 K/UL (ref 1.8–8)
NEUTS SEG NFR BLD: 61 % (ref 32–75)
NRBC # BLD: 0 K/UL (ref 0–0.01)
NRBC BLD-RTO: 0 PER 100 WBC
PLATELET # BLD AUTO: 236 K/UL (ref 150–400)
PMV BLD AUTO: 10.7 FL (ref 8.9–12.9)
POTASSIUM SERPL-SCNC: 4.3 MMOL/L (ref 3.5–5.1)
PROT SERPL-MCNC: 7.6 G/DL (ref 6.4–8.2)
RBC # BLD AUTO: 4.84 M/UL (ref 3.8–5.2)
SODIUM SERPL-SCNC: 138 MMOL/L (ref 136–145)
TRIGL SERPL-MCNC: 409 MG/DL (ref ?–150)
VLDLC SERPL CALC-MCNC: ABNORMAL MG/DL
WBC # BLD AUTO: 6 K/UL (ref 3.6–11)

## 2023-08-24 NOTE — TELEPHONE ENCOUNTER
Requested Prescriptions     Pending Prescriptions Disp Refills    omeprazole (PRILOSEC) 20 MG delayed release capsule [Pharmacy Med Name: OMEPRAZOLE 20MG CAPSULES] 90 capsule 0     Sig: TAKE 1 CAPSULE BY MOUTH DAILY    ondansetron (ZOFRAN-ODT) 4 MG disintegrating tablet [Pharmacy Med Name: ONDANSETRON ODT 4MG TABLETS] 10 tablet 0     Sig: DISSOLVE 1 TABLET ON THE TONGUE EVERY 8 HOURS AS NEEDED FOR NAUSEA OR VOMITING    montelukast (SINGULAIR) 10 MG tablet [Pharmacy Med Name: MONTELUKAST 10MG TABLETS] 90 tablet 0     Sig: TAKE 1 TABLET BY MOUTH DAILY     Last filled:  3/3/23    Last filled:   omeprazole (PRILOSEC) 20 mg capsule 90 Capsule 1 3/3/2023     ondansetron (ZOFRAN ODT) 4 mg disintegrating tablet 10 Tablet 1 3/3/2023     montelukast (SINGULAIR) 10 mg tablet 90 Tablet 1 11/14/2022

## 2023-08-25 RX ORDER — MONTELUKAST SODIUM 10 MG/1
TABLET ORAL
Qty: 90 TABLET | Refills: 1 | Status: SHIPPED | OUTPATIENT
Start: 2023-08-25

## 2023-08-25 RX ORDER — ONDANSETRON 4 MG/1
TABLET, ORALLY DISINTEGRATING ORAL
Qty: 10 TABLET | Refills: 1 | Status: SHIPPED | OUTPATIENT
Start: 2023-08-25

## 2023-08-25 RX ORDER — OMEPRAZOLE 20 MG/1
CAPSULE, DELAYED RELEASE ORAL
Qty: 90 CAPSULE | Refills: 1 | Status: SHIPPED | OUTPATIENT
Start: 2023-08-25

## (undated) DEVICE — SUTURE CHROMIC GUT SZ 4-0 L18IN ABSRB BRN L13MM P-3 3/8 CIR 1654G

## (undated) DEVICE — INTENDED FOR TISSUE SEPARATION, AND OTHER PROCEDURES THAT REQUIRE A SHARP SURGICAL BLADE TO PUNCTURE OR CUT.: Brand: BARD-PARKER ® CARBON RIB-BACK BLADES

## (undated) DEVICE — SUT PROL 2-0 30IN CT2 BLU --

## (undated) DEVICE — 4-PORT MANIFOLD: Brand: NEPTUNE 2

## (undated) DEVICE — PACKING 8004007 NEURAY 200PK 13X76MM: Brand: NEURAY ®

## (undated) DEVICE — SOL ANTI-FOG 6ML MEDC -- MEDICHOICE - CONVERT TO 358427

## (undated) DEVICE — DEVON™ KNEE AND BODY STRAP 60" X 3" (1.5 M X 7.6 CM): Brand: DEVON

## (undated) DEVICE — NEEDLE HYPO 27GA L1.25IN GRY POLYPR HUB S STL REG BVL STR

## (undated) DEVICE — KENDALL SCD EXPRESS SLEEVES, KNEE LENGTH, MEDIUM: Brand: KENDALL SCD

## (undated) DEVICE — SPLINT 1524055 DOYLE II AIRWAY SET: Brand: DOYLE II ™

## (undated) DEVICE — PACK,EENT,STERILE,PK I: Brand: MEDLINE

## (undated) DEVICE — HEAD DONUT FOAM POSITIONER: Brand: CARDINAL HEALTH

## (undated) DEVICE — DRAPE TWL SURG 16X26IN BLU ORB04] ALLCARE INC]

## (undated) DEVICE — SYR 10ML CTRL LR LCK NSAF LF --

## (undated) DEVICE — SUTURE ABSORBABLE MONOFILAMENT 4-0 SC-1 18 IN PLN GUT 1824H

## (undated) DEVICE — STERILE COTTON TIPPED APPLICATORS: Brand: PURITAN